# Patient Record
Sex: FEMALE | Race: AMERICAN INDIAN OR ALASKA NATIVE
[De-identification: names, ages, dates, MRNs, and addresses within clinical notes are randomized per-mention and may not be internally consistent; named-entity substitution may affect disease eponyms.]

---

## 2017-09-21 NOTE — MAMMOGRAPHY REPORT
BILATERAL MAMMOGRAM with CAD:



HISTORY:Cancer screening. Comparison study is dated September 19, 2016.



FINDINGS:

The breasts are almost entirely fat (<25% glandular).  No mass,

distortion, suspicious calcification, or skin change is seen.



IMPRESSION:

Negative mammogram.  There is no mammographic evidence of

malignancy.



RECOMMENDATION:

Follow-up per ACS guidelines.



BI-RADS CATEGORY: 1 = Negative



ACR BI-RADS MAMMOGRAPHIC CODES:

0 = Needs additional imaging evaluation; 1 = Negative; 2 = Benign;

3 = Probably benign; 4 = Suspicious; 5 = Malignant; 6 = Known

biopsy-proven malignancy



COMMENT:

      1.   Dense breast tissue, i.e., adenosis, fibrocystic 

            changes, etc., may obscure an underlying neoplasm.

      2.   Approximately 10% of cancers are not detected with

            mammography.

      3.   A negative mammography report should not delay biopsy 

            if a clinically suspicious mass is present.



COMMENT:

Patient follow-up letters are generated in [employer]

## 2018-09-24 ENCOUNTER — HOSPITAL ENCOUNTER (OUTPATIENT)
Dept: HOSPITAL 5 - MAMMO | Age: 68
Discharge: HOME | End: 2018-09-24
Attending: SPECIALIST
Payer: MEDICARE

## 2018-09-24 DIAGNOSIS — Z12.31: Primary | ICD-10-CM

## 2018-09-24 PROCEDURE — 77067 SCR MAMMO BI INCL CAD: CPT

## 2018-09-24 NOTE — MAMMOGRAPHY REPORT
BILATERAL MAMMOGRAM:



FINDINGS:

The breasts are almost entirely fat (<25% glandular).  No mass,

distortion, suspicious calcification, or skin change is seen. No 

interval changes when compared to prior exams dating back to September 2016.



CAD was utilized.



IMPRESSION:

Negative mammogram.  There is no mammographic evidence of

malignancy.



RECOMMENDATION:

Follow-up per ACS guidelines.



BI-RADS CATEGORY: 1 = Negative



ACR BI-RADS MAMMOGRAPHIC CODES:

0 = Needs additional imaging evaluation; 1 = Negative; 2 = Benign;

3 = Probably benign; 4 = Suspicious; 5 = Malignant; 6 = Known

biopsy-proven malignancy



COMMENT:

      1.   Dense breast tissue, i.e., adenosis, fibrocystic 

            changes, etc., may obscure an underlying neoplasm.

      2.   Approximately 10% of cancers are not detected with

            mammography.

      3.   A negative mammography report should not delay biopsy 

            if a clinically suspicious mass is present.



COMMENT:

Patient follow-up letters are generated in [employer].

## 2019-11-04 ENCOUNTER — HOSPITAL ENCOUNTER (OUTPATIENT)
Dept: HOSPITAL 5 - MAMMO | Age: 69
Discharge: HOME | End: 2019-11-04
Attending: SPECIALIST
Payer: OTHER GOVERNMENT

## 2019-11-04 DIAGNOSIS — Z12.31: Primary | ICD-10-CM

## 2019-11-04 PROCEDURE — 77067 SCR MAMMO BI INCL CAD: CPT

## 2019-11-05 NOTE — MAMMOGRAPHY REPORT
DIGITAL SCREENING MAMMOGRAM WITH CAD, 11/4/2019



INDICATION: Routine screening mammography. 



TECHNIQUE:  Digital bilateral  2D mammography was obtained in the craniocaudal and mediolateral obliq
ue projections. This examination was interpreted with the benefit of Computer-Aided Detection analysi
s.



COMPARISON: 9/24/2018



FINDINGS: 



Breast Density: The breasts are almost entirely fatty.



There is no evidence of dominant mass, suspicious calcifications or architectural distortion in eithe
r breast.



IMPRESSION: No mammographic evidence of malignancy.



Follow up recommendation: Routine yearly



BI-RADS Category 1:  Negative.



A "normal" or negative report should not discourage follow up or biopsy of a clinically significant f
inding.



A written summary of these findings will be mailed to the patient. The patient will be entered into a
 mammography reporting system which will generate a reminder letter for the patient's next appointmen
t at the appropriate interval.



The American College of Radiology recommends yearly mammograms starting at age 40 and continuing as l
claudia as a woman is in good health.  Breast MRI is recommended for women with an approximate 20-25% or 
greater lifetime risk of breast cancer, including women with a strong family history of breast or ova
eliud cancer or who have been treated for Hodgkin's disease.



Signer Name: Josh Coronado MD 

Signed: 11/5/2019 2:19 PM

 Workstation Name: [id number]

## 2019-11-13 ENCOUNTER — HOSPITAL ENCOUNTER (OUTPATIENT)
Dept: HOSPITAL 5 - XRAY | Age: 69
Discharge: HOME | End: 2019-11-13
Attending: INTERNAL MEDICINE
Payer: MEDICARE

## 2019-11-13 DIAGNOSIS — M25.569: Primary | ICD-10-CM

## 2019-11-13 NOTE — XRAY REPORT
RIGHT KNEE 4 VIEWS.



INDICATION / CLINICAL INFORMATION:

MAIN: KNEE JOINT PAINFUL ON MOVEMENT PAIN FOR THE PAST 10 YRS, PAIN INCREASING X2 YRS, NO RECENT INJU
RY; PREVIOUS INJURY 40+YRS AGO



COMPARISON:

None available.

 

FINDINGS:



BONES / JOINT(S): No acute fracture or subluxation. DJD greatest the patellofemoral compartment where
 changes are moderate. Underlying osteopenia.

SOFT TISSUES: No significant abnormality.



ADDITIONAL FINDINGS: None.







Signer Name: Taj Schaefer MD 

Signed: 11/13/2019 5:15 PM

 Workstation Name: [email or web address]

## 2021-02-24 ENCOUNTER — HISTORICAL (OUTPATIENT)
Dept: ADMINISTRATIVE | Facility: HOSPITAL | Age: 71
End: 2021-02-24

## 2021-02-24 LAB
ALBUMIN SERPL BCP-MCNC: 3.7 G/DL (ref 3.5–5)
ALBUMIN/GLOB SERPL: 0.9 {RATIO}
ALP SERPL-CCNC: 66 U/L (ref 55–142)
ALT SERPL W P-5'-P-CCNC: 20 U/L (ref 13–56)
ANION GAP SERPL CALCULATED.3IONS-SCNC: 9 MMOL/L (ref 7–16)
AST SERPL W P-5'-P-CCNC: 14 U/L (ref 15–37)
BASOPHILS # BLD AUTO: 0.04 X10E3/UL (ref 0–0.2)
BASOPHILS NFR BLD AUTO: 0.5 % (ref 0–1)
BILIRUB SERPL-MCNC: 0.6 MG/DL (ref 0–1.2)
BUN SERPL-MCNC: 11 MG/DL (ref 7–18)
BUN/CREAT SERPL: 14
CALCIUM SERPL-MCNC: 9 MG/DL (ref 8.5–10.1)
CHLORIDE SERPL-SCNC: 110 MMOL/L (ref 98–107)
CHOLEST SERPL-MCNC: 150 MG/DL
CHOLEST/HDLC SERPL: 2.2 {RATIO}
CO2 SERPL-SCNC: 26 MMOL/L (ref 21–32)
CREAT SERPL-MCNC: 0.76 MG/DL (ref 0.5–1.02)
EOSINOPHIL # BLD AUTO: 0.05 X10E3/UL (ref 0–0.5)
EOSINOPHIL NFR BLD AUTO: 0.6 % (ref 1–4)
ERYTHROCYTE [DISTWIDTH] IN BLOOD BY AUTOMATED COUNT: 13.8 % (ref 11.5–14.5)
GLOBULIN SER-MCNC: 3.9 G/DL (ref 2–4)
GLUCOSE SERPL-MCNC: 84 MG/DL (ref 74–106)
HCT VFR BLD AUTO: 38.2 % (ref 38–47)
HDLC SERPL-MCNC: 69 MG/DL
HGB BLD-MCNC: 12.5 G/DL (ref 12–16)
IMM GRANULOCYTES # BLD AUTO: 0.01 X10E3/UL (ref 0–0.04)
IMM GRANULOCYTES NFR BLD: 0.1 % (ref 0–0.4)
LDLC SERPL CALC-MCNC: 68 MG/DL
LYMPHOCYTES # BLD AUTO: 2.52 X10E3/UL (ref 1–4.8)
LYMPHOCYTES NFR BLD AUTO: 29.7 % (ref 27–41)
MAGNESIUM SERPL-MCNC: 1.9 MG/DL (ref 1.7–2.3)
MCH RBC QN AUTO: 29.8 PG (ref 27–31)
MCHC RBC AUTO-ENTMCNC: 32.7 G/DL (ref 32–36)
MCV RBC AUTO: 91 FL (ref 80–96)
MONOCYTES # BLD AUTO: 0.74 X10E3/UL (ref 0–0.8)
MONOCYTES NFR BLD AUTO: 8.7 % (ref 2–6)
MPC BLD CALC-MCNC: 11.2 FL (ref 9.4–12.4)
NEUTROPHILS # BLD AUTO: 5.12 X10E3/UL (ref 1.8–7.7)
NEUTROPHILS NFR BLD AUTO: 60.4 % (ref 53–65)
NRBC # BLD AUTO: 0 X10E3/UL (ref 0–0)
NRBC, AUTO (.00): 0 /100 (ref 0–0)
PLATELET # BLD AUTO: 266 X10E3/UL (ref 150–400)
POTASSIUM SERPL-SCNC: 3.6 MMOL/L (ref 3.5–5.1)
PROT SERPL-MCNC: 7.6 G/DL (ref 6.4–8.2)
RBC # BLD AUTO: 4.2 X10E6/UL (ref 4.2–5.4)
SODIUM SERPL-SCNC: 141 MMOL/L (ref 136–145)
T4 FREE SERPL-MCNC: 0.96 NG/DL (ref 0.76–1.46)
TRIGL SERPL-MCNC: 67 MG/DL
TSH SERPL DL<=0.005 MIU/L-ACNC: 0.76 UIU/ML (ref 0.36–3.74)
WBC # BLD AUTO: 8.48 X10E3/UL (ref 4.5–11)

## 2021-02-25 LAB
25(OH)D3 SERPL-MCNC: 16.8 NG/ML
EST. AVERAGE GLUCOSE BLD GHB EST-MCNC: 87 MG/DL
HBA1C MFR BLD HPLC: 5.2 %

## 2021-03-29 ENCOUNTER — HOSPITAL ENCOUNTER (OUTPATIENT)
Dept: RADIOLOGY | Facility: HOSPITAL | Age: 71
Discharge: HOME | End: 2021-03-29
Attending: OBSTETRICS & GYNECOLOGY
Payer: MEDICARE

## 2021-03-29 VITALS — BODY MASS INDEX: 30.73 KG/M2 | HEIGHT: 64 IN | WEIGHT: 180 LBS

## 2021-03-29 DIAGNOSIS — Z12.31 SCREENING MAMMOGRAM, ENCOUNTER FOR: ICD-10-CM

## 2021-03-29 PROCEDURE — 77067 SCR MAMMO BI INCL CAD: CPT | Mod: TC

## 2021-05-05 ENCOUNTER — HOSPITAL ENCOUNTER (OUTPATIENT)
Dept: RADIOLOGY | Facility: HOSPITAL | Age: 71
Discharge: HOME | End: 2021-05-05
Attending: RADIOLOGY
Payer: MEDICARE

## 2021-05-05 DIAGNOSIS — R92.8 ABNORMAL FINDING ON BREAST IMAGING: ICD-10-CM

## 2021-05-05 DIAGNOSIS — M54.6 PAIN IN THORACIC SPINE: Primary | ICD-10-CM

## 2021-05-05 PROCEDURE — 77065 DX MAMMO INCL CAD UNI: CPT | Mod: TC,LT

## 2021-05-18 ENCOUNTER — CLINICAL SUPPORT (OUTPATIENT)
Dept: REHABILITATION | Facility: HOSPITAL | Age: 71
End: 2021-05-18
Payer: MEDICARE

## 2021-05-18 ENCOUNTER — DOCUMENTATION ONLY (OUTPATIENT)
Dept: REHABILITATION | Facility: HOSPITAL | Age: 71
End: 2021-05-18

## 2021-05-18 DIAGNOSIS — M25.60 DECREASED RANGE OF MOTION: ICD-10-CM

## 2021-05-18 DIAGNOSIS — R53.1 DECREASED STRENGTH: ICD-10-CM

## 2021-05-18 DIAGNOSIS — M54.6 PAIN IN THORACIC SPINE: Primary | ICD-10-CM

## 2021-05-18 DIAGNOSIS — M25.512 LEFT SHOULDER PAIN, UNSPECIFIED CHRONICITY: ICD-10-CM

## 2021-05-18 DIAGNOSIS — M25.561 RIGHT KNEE PAIN, UNSPECIFIED CHRONICITY: ICD-10-CM

## 2021-05-18 DIAGNOSIS — M25.551 RIGHT HIP PAIN: ICD-10-CM

## 2021-05-18 PROCEDURE — 97110 THERAPEUTIC EXERCISES: CPT | Mod: PN,CQ

## 2022-01-21 DIAGNOSIS — M25.561 RIGHT KNEE PAIN: ICD-10-CM

## 2022-01-21 DIAGNOSIS — M25.519 SHOULDER PAIN: ICD-10-CM

## 2022-01-21 DIAGNOSIS — M54.2 CERVICAL PAIN: Primary | ICD-10-CM

## 2022-01-21 DIAGNOSIS — M54.50 LOW BACK PAIN: ICD-10-CM

## 2022-01-29 ENCOUNTER — HOSPITAL ENCOUNTER (EMERGENCY)
Facility: HOSPITAL | Age: 72
Discharge: HOME | End: 2022-01-29
Payer: MEDICARE

## 2022-01-29 VITALS
RESPIRATION RATE: 20 BRPM | SYSTOLIC BLOOD PRESSURE: 167 MMHG | OXYGEN SATURATION: 97 % | WEIGHT: 178.5 LBS | DIASTOLIC BLOOD PRESSURE: 88 MMHG | TEMPERATURE: 99 F | HEIGHT: 64 IN | BODY MASS INDEX: 30.48 KG/M2 | HEART RATE: 70 BPM

## 2022-01-29 DIAGNOSIS — R09.A9 FOREIGN BODY SENSATION IN EAR CANAL, LEFT: Primary | ICD-10-CM

## 2022-01-29 PROCEDURE — 99282 EMERGENCY DEPT VISIT SF MDM: CPT | Mod: ,,, | Performed by: NURSE PRACTITIONER

## 2022-01-29 PROCEDURE — 99282 EMERGENCY DEPT VISIT SF MDM: CPT

## 2022-01-29 PROCEDURE — 99282 PR EMERGENCY DEPT VISIT,LEVEL II: ICD-10-PCS | Mod: ,,, | Performed by: NURSE PRACTITIONER

## 2022-01-29 NOTE — DISCHARGE INSTRUCTIONS
Avoid placing anything smaller than your finger when covered with vasculopathy in 2 years.  Follow-up with your primary care provider as needed.

## 2022-01-29 NOTE — ED PROVIDER NOTES
Encounter Date: 1/29/2022       History     Chief Complaint   Patient presents with    Foreign Body in Ear     71-year-old female presents emergency department 1 week history of complaint of foreign body in her left ear.  States that she was taking half pin out of her hand feels her hair pin fell into her ear.  She has been trying to retrieve herself without success.  She denies injury trauma.  She denies fever, chills or any sign or symptom flexion.  She denies ear pain.        Review of patient's allergies indicates:  No Known Allergies  History reviewed. No pertinent past medical history.  Past Surgical History:   Procedure Laterality Date    BUNIONECTOMY Bilateral     HYSTERECTOMY      ROTATOR CUFF REPAIR Right      History reviewed. No pertinent family history.  Social History     Tobacco Use    Smoking status: Never Smoker    Smokeless tobacco: Never Used   Substance Use Topics    Alcohol use: Never    Drug use: Never     Review of Systems   Neurological: Negative for dizziness, syncope, speech difficulty, weakness, light-headedness and headaches.       Physical Exam     Initial Vitals [01/29/22 1330]   BP Pulse Resp Temp SpO2   (!) 167/88 70 20 98.5 °F (36.9 °C) 97 %      MAP       --         Physical Exam    Constitutional: She appears well-developed and well-nourished.   HENT:   Head: Normocephalic and atraumatic.   Left Ear: External ear normal.   Nose: Nose normal.   Mouth/Throat: Oropharynx is clear and moist.   Small area of dried blood in the middle ear canal.  Scratch noted.  No foreign body noted.  Tympanic membrane normal.           Medical Screening Exam   See Full Note    ED Course   Procedures  Labs Reviewed - No data to display       Imaging Results    None          Medications - No data to display                    Clinical Impression:   Final diagnoses:  [H61.892] Foreign body sensation in ear canal, left (Primary)          ED Disposition Condition    Discharge Stable        ED  Prescriptions     None        Follow-up Information    None          Ben Hawley, JUAN  01/29/22 1354       Ben Hawley, JUAN  03/02/22 1519

## 2022-02-14 ENCOUNTER — CLINICAL SUPPORT (OUTPATIENT)
Dept: REHABILITATION | Facility: HOSPITAL | Age: 72
End: 2022-02-14
Payer: MEDICARE

## 2022-02-14 DIAGNOSIS — M54.2 CERVICAL PAIN: ICD-10-CM

## 2022-02-14 DIAGNOSIS — G89.29 CHRONIC BILATERAL LOW BACK PAIN WITHOUT SCIATICA: ICD-10-CM

## 2022-02-14 DIAGNOSIS — M25.511 BILATERAL SHOULDER PAIN, UNSPECIFIED CHRONICITY: ICD-10-CM

## 2022-02-14 DIAGNOSIS — M54.50 LOW BACK PAIN: ICD-10-CM

## 2022-02-14 DIAGNOSIS — M25.519 SHOULDER PAIN: ICD-10-CM

## 2022-02-14 DIAGNOSIS — M54.50 CHRONIC BILATERAL LOW BACK PAIN WITHOUT SCIATICA: ICD-10-CM

## 2022-02-14 DIAGNOSIS — M25.512 BILATERAL SHOULDER PAIN, UNSPECIFIED CHRONICITY: ICD-10-CM

## 2022-02-14 PROCEDURE — 97161 PT EVAL LOW COMPLEX 20 MIN: CPT | Mod: PN

## 2022-02-14 PROCEDURE — 97110 THERAPEUTIC EXERCISES: CPT | Mod: PN

## 2022-02-14 NOTE — PLAN OF CARE
[facility]  Physical Therapy Initial Evaluation    Name: Shelby Salcedo  Clinic Number: [id number]    Therapy Diagnosis:   Encounter Diagnoses   Name Primary?    Cervical pain     Shoulder pain     Low back pain      Physician: Stacey Chapa, NP    Physician Orders: PT Eval and Treat    Medical Diagnosis from Referral: neck , shoulder and back pain   Evaluation Date: 2/14/2022  Authorization Period Expiration: medicare   Plan of Care Expiration: 3/13/2022  Visit # / Visits authorized: 1/ 20    Time In: 1230  Time Out: 1330  Total Appointment Time (timed & untimed codes): 60 minutes    Precautions: Standard    Subjective   Date of onset: pt states she began feeling worse after having covid last July and this last October she became weak and just not as active, having increased back , neck and shoulder pain, pt voices she can tell a lot of her pain is from postural weakness .     History of current condition - Shelby reports: hx above.      Medical History:   No past medical history on file.    Surgical History:   Shelby Salcedo  has a past surgical history that includes Hysterectomy; Bunionectomy (Bilateral); and Rotator cuff repair (Right).    Medications:   Shelby currently has no medications in their medication list.    Allergies:   Review of patient's allergies indicates:  No Known Allergies     Imaging, none:      Prior Therapy: over 6 months   Social History:   lives alone  Occupation: retired  Prior Level of Function: independent   Current Level of Function: increased fatigue shoulder and back pain      Pain:  Current 10/10, worst 10/10, best 6/10   Location: bilateral shoulder, back and neck    Description: Aching, Dull, Burning, Throbbing and Variable  Aggravating Factors: Sitting, Standing and Getting out of bed/chair  Easing Factors: nothing    Pts goals: be able to increase activity to be able to do her yard work     Objective     Posture:   Forward head: increased  Thoracic curve:  increased  Cervical curve: increased  Laterally flexed: left  Rotated: left  Rounded shoulders: yes  Scoliosis: yes  Shoulder height: right increased  Clavical height: right increased  Comments:    Cervical AROM:    SBL: 10   SBR: 15  RR: 30  RL:  20    MMT Right  Left    C1-C2 Chin up MMT strength: 3/5 MMT strength: 3/5   C1-C2 Chin in MMT shoulder: 3/5 MMT strength: 3/5   C3 Lateral flexion MMT strength: 3/5 MMT strength: 3/5   C4 Shoulder Shrug MMT strength: 4+/5 MMT strength: 4+/5   C5 Biceps MMT strength: 4/5 MMT strength: 4/5   C6 Wrist extension MMT strength: 4/5 MMT strength: 4/5   C7 Triceps MMT strength: 4/5 MMT strength: 4/5   Hip flexion      MMT strength: 3+/5 MMT strength: 3+/5     Knee flexion   Knee extension       Shoulder AROM Right  Left    Flexion (180) 120 120   Internal rotation (45)     External rotation (45) 45 45   Abduction (180) 120 120   Hip flexion  95 95   Hamstring  -15 -15   Other       Segment/Mobility:     Special test:    Compression test Negative   Thoracic outlet  Negative   Distraction Negative                 Palpation Body side Positive/negative Increased tone   Sternocleidomastoid bilateral Negative yes   Scalenes bilateral Negative yes   1st rib bilateral Positive yes   Upper traps bilateral Positive yes   Suboccipitals bilateral Positive yes   Transverse process bilateral Positive yes   Spinous process bilateral Positive yes   Pec Minor bilateral Positive yes   Masseter bilateral Negative no   Mastoid process bilateral Negative no         Comments        Other tests/information:    Limitation/Restriction for FOTO shoulder Survey    Therapist reviewed FOTO scores for Shelby Salcedo on 2/14/2022.   FOTO documents entered into [employer] see Media section.    Limitation Score: 34%         TREATMENT     Shelby received the treatments listed below:  THERAPEUTIC EXERCISES to develop strength, endurance, ROM, flexibility and posture for 20 minutes including home ex program     Home  Exercises and Patient Education Provided    Education provided:   - range of motion and strengthening     Written Home Exercises Provided: yes.  Exercises were reviewed and Shelby was able to demonstrate them prior to the end of the session.  Shelby demonstrated good  understanding of the education provided.     See EMR under Media for exercises provided 2/14/2022.    Assessment   Shelby is a 71 y.o. female referred to outpatient Physical Therapy with a medical diagnosis of low back pain , neck pain shoulder pain . Pt presents with decreased range of motion and strength.      Pt prognosis is Excellent.   Pt will benefit from skilled outpatient Physical Therapy to address the deficits stated above and in the chart below, provide pt/family education, and to maximize pt's level of independence.     Plan of care discussed with patient: Yes  Pt's spiritual, cultural and educational needs considered and patient is agreeable to the plan of care and goals as stated below:     Anticipated Barriers for therapy: decreased range of motion and strength .     Goals:  Short Term Goals: 4 weeks   Pt will be independent with home ex program .   Pt will increase hip flexion to 115 degrees   Pt will be able to tolerate 20 min of cardio   Pt will be able to increase cervical rotation to 45  degrees rotation   Pt will be able to increase lateral tilt to 30 degrees     Long Term Goals: 6 weeks   Pt will be able to shop 30 min   Pt will be able to increase rotation cervical rotation to 50 degrees, lateral tilt to 40 degrees  Pt will be able to shop greater than one hour   Be able to return to yard work.     Plan   Plan of care Certification: 2/14/2022 to 3/13/2022.    Outpatient Physical Therapy 2 times weekly for 4 weeks to include the following interventions: Therapeutic Exercise, modalities as needed.     Plan of care has been reestablished with Iwona ELIAS and Tiana ELIAS. .     Dakota Canales, PT           I CERTIFY  THE NEED FOR THESE SERVICES FURNISHED UNDER THIS PLAN OF TREATMENT AND WHILE UNDER MY CARE.    Physician's comments:      Physician's Signature: ___________________________________________________

## 2022-02-23 ENCOUNTER — CLINICAL SUPPORT (OUTPATIENT)
Dept: REHABILITATION | Facility: HOSPITAL | Age: 72
End: 2022-02-23
Payer: MEDICARE

## 2022-02-23 DIAGNOSIS — R53.1 DECREASED STRENGTH: ICD-10-CM

## 2022-02-23 DIAGNOSIS — M54.2 CERVICAL PAIN: Primary | ICD-10-CM

## 2022-02-23 DIAGNOSIS — M25.512 BILATERAL SHOULDER PAIN, UNSPECIFIED CHRONICITY: ICD-10-CM

## 2022-02-23 DIAGNOSIS — M25.511 BILATERAL SHOULDER PAIN, UNSPECIFIED CHRONICITY: ICD-10-CM

## 2022-02-23 PROCEDURE — 97110 THERAPEUTIC EXERCISES: CPT | Mod: PN,CQ

## 2022-02-23 PROCEDURE — 97014 ELECTRIC STIMULATION THERAPY: CPT | Mod: PN,CQ

## 2022-02-23 NOTE — PROGRESS NOTES
"  Physical Therapy Treatment Note     Name: Shelby Salcedo  Clinic Number: [id number]    Therapy Diagnosis:   Encounter Diagnoses   Name Primary?    Cervical pain Yes    Bilateral shoulder pain, unspecified chronicity     Decreased strength      Physician: Stacey Chapa NP    Visit Date: 2/23/2022    Physician Orders: PT Eval and Treat    Medical Diagnosis from Referral: neck , shoulder and back pain   Evaluation Date: 2/14/2022  Authorization Period Expiration: medicare   Plan of Care Expiration: 3/13/2022  Visit # / Visits authorized: 2/ 20  PTA Visit #: 1    Time In: 1230  Time Out: 1320  Total Billable Time: 50 minutes    Precautions: Standard    Received Plan of Care per Dakota Canales PT     Subjective     Pt reports: pain/stiffness as noted in neck, shoulders and upper back. Denies taking pain meds..."I can't take anything"; reports living alone and has a lot to do and has been busy today.  She was compliant with home exercise program.  Response to previous treatment: sore, stiff      Pain: 8/10  Location: bilateral shoulders, neck, upper back    Objective     Shelby received therapeutic exercises to develop strength, ROM, flexibility and posture for 35 minutes including:    UBE x 5 minutes   pulleys x 4 minutes   Scapular retraction with BUE rows, extension x 15 repetitions each  Doorway pec stretching, 10 x 10 second hold   Wall slides with towel for flexion x 15 repetitions each  Seated cervical range of motion, 6x10 second hold each way: rotation and tilt, right and left   Supine bilateral lower extremity stretching, 3x20 second hold each:   Single knee to chest, hamstring, hip external rotation, piriformis    Range of motion measures:   Neck rotation   Right 65 degrees   Left 50 degrees    Neck tilt  Right 25 degrees  Left 20 degrees   Hip flexion   Right  95 degrees  Left 105 degrees   Hamstring   Right -13 degrees Left -10 degrees       Shelby received the following direct contact modalities " after being cleared for contraindications: Ultrasound:  Shelby received ultrasound to manage pain and inflammation at 100 % duty cycle applied to the NA at an intensity of NA W/cm2  for a duration of NA minutes. Patient tolerated treatment well without adverse effects. Therapist was in attendance throughout intervention.    Shelby received the following supervised modalities after being cleared for contradictions: IFC Electrical Stimulation:  Shelby received IFC Electrical Stimulation for pain control applied to the neck and thoracic paraspinals. Pt received stimulation at 100 % scan at a frequency of 14 for 15 minutes. Shelby tolderated treatment well without any adverse effects.    Shelby received hot pack for 15 minutes to neck and upper back with IFC.      Home Exercises Provided and Patient Education Provided     Education provided: continue with current home exercise program and work on posture correction throughout the day; along with bilateral lower extremity stretches as done during today's session     Written Home Exercises Provided: Patient instructed to cont prior HEP.  Exercises were reviewed and Shelby was able to demonstrate them prior to the end of the session.  Shelby demonstrated good  understanding of the education provided.     See EMR under Patient Instructions for exercises provided prior visit.    Assessment     Improving neck range of motion; pt pain down to 7/10 post treatment from 8/10 on arrival  Slow, guarded during treatment session and transitioning between activities  Shelby Is progressing well towards her goals.   Pt prognosis is Good.     Pt will continue to benefit from skilled outpatient physical therapy to address the deficits listed in the problem list box on initial evaluation, provide pt/family education and to maximize pt's level of independence in the home and community environment.      Anticipated barriers to physical therapy: home exercise program  compliance    Goals:  Short Term Goals: 4 weeks   Pt will be independent with home ex program .   Pt will increase hip flexion to 115 degrees   Pt will be able to tolerate 20 min of cardio   Pt will be able to increase cervical rotation to 45  degrees rotation   Pt will be able to increase lateral tilt to 30 degrees      Long Term Goals: 6 weeks   Pt will be able to shop 30 min   Pt will be able to increase rotation cervical rotation to 50 degrees, lateral tilt to 40 degrees  Pt will be able to shop greater than one hour   Be able to return to yard work.     Plan     Continue per Plan of Care and progress as pt able   Tiana Baeza, PTA  2/23/2022

## 2022-03-09 ENCOUNTER — CLINICAL SUPPORT (OUTPATIENT)
Dept: REHABILITATION | Facility: HOSPITAL | Age: 72
End: 2022-03-09
Payer: MEDICARE

## 2022-03-09 DIAGNOSIS — G89.29 CHRONIC BILATERAL LOW BACK PAIN WITHOUT SCIATICA: ICD-10-CM

## 2022-03-09 DIAGNOSIS — M25.511 BILATERAL SHOULDER PAIN, UNSPECIFIED CHRONICITY: Primary | ICD-10-CM

## 2022-03-09 DIAGNOSIS — M54.50 CHRONIC BILATERAL LOW BACK PAIN WITHOUT SCIATICA: ICD-10-CM

## 2022-03-09 DIAGNOSIS — M25.512 BILATERAL SHOULDER PAIN, UNSPECIFIED CHRONICITY: Primary | ICD-10-CM

## 2022-03-09 DIAGNOSIS — M54.2 CERVICAL PAIN: ICD-10-CM

## 2022-03-09 PROCEDURE — 97110 THERAPEUTIC EXERCISES: CPT | Mod: PN,CQ

## 2022-03-09 PROCEDURE — 97014 ELECTRIC STIMULATION THERAPY: CPT | Mod: PN,CQ

## 2022-03-09 NOTE — PROGRESS NOTES
Physical Therapy Treatment Note     Name: Shelby Salcedo  Clinic Number: [id number]    Therapy Diagnosis:   Encounter Diagnoses   Name Primary?    Bilateral shoulder pain, unspecified chronicity Yes    Cervical pain      Physician: Stacey Chapa NP    Visit Date: 3/9/2022    Physician Orders: PT Eval and Treat    Medical Diagnosis from Referral: neck , shoulder and back pain   Evaluation Date: 2/14/2022  Authorization Period Expiration: medicare   Plan of Care Expiration: 3/13/2022  Visit # / Visits authorized: 3/ 20  PTA Visit #: 2    Time In: 1230  Time Out: 120  Total Billable Time: 50 minutes    Precautions: Standard     Subjective     Pt reports: I've been doing some of exercises at home. Shoulders and neck hurt the most today and I'm having some vertigo.  She was compliant with home exercise program.  Response to previous treatment: sore, stiff      Pain: 7/10  Location: bilateral shoulders, neck, upper back    Objective     Shelby received therapeutic exercises to develop strength, ROM, flexibility and posture for 35 minutes including:    UBE x 5 minutes   pulleys x 4 minutes   Scapular retraction with BUE rows, extension x 15 repetitions each  Doorway pec stretching, 10 x 10 second hold   Seated cervical range of motion, 6x10 second hold each way: rotation and tilt, right and left   Supine bilateral lower extremity stretching, 3x20 second hold each:   Single knee to chest, hamstring, hip external rotation, piriformis  Bilateral hamstrings stretch on step x 5  Slant board x 2'  Swissball knee flexion x 10  Swissball trunk rotation x 10    Range of motion measures:   Neck rotation   Right 60 degrees   Left 50 degrees    Neck tilt  Right 23 degrees  Left 20 degrees   Hip flexion   Right  97 degrees  Left 105 degrees   Hamstring   Right -13 degrees Left -10 degrees       Shelby received the following direct contact modalities after being cleared for contraindications: Ultrasound:  Shelby received  ultrasound to manage pain and inflammation at 100 % duty cycle applied to the NA at an intensity of NA W/cm2  for a duration of NA minutes. Patient tolerated treatment well without adverse effects. Therapist was in attendance throughout intervention.    Shelby received the following supervised modalities after being cleared for contradictions: IFC Electrical Stimulation:  Shelby received IFC Electrical Stimulation for pain control applied to the neck and thoracic paraspinals. Pt received stimulation at 100 % scan at a frequency of 12 for 15 minutes. Shelby tolderated treatment well without any adverse effects.    Shelby received hot pack for 15 minutes to neck and upper back with IFC.      Home Exercises Provided and Patient Education Provided     Education provided: continue with current home exercise program and work on posture correction throughout the day; along with bilateral lower extremity stretches as done during today's session     Written Home Exercises Provided: Patient instructed to cont prior HEP.  Exercises were reviewed and Shelby was able to demonstrate them prior to the end of the session.  Shelby demonstrated good  understanding of the education provided.     See EMR under Patient Instructions for exercises provided prior visit.    Assessment   Patient voicing decreased pain 5/10 post treatment.  Eric Is progressing well towards her goals.   Pt prognosis is Good.     Pt will continue to benefit from skilled outpatient physical therapy to address the deficits listed in the problem list box on initial evaluation, provide pt/family education and to maximize pt's level of independence in the home and community environment.      Anticipated barriers to physical therapy: home exercise program compliance    Goals:  Short Term Goals: 4 weeks   Pt will be independent with home ex program .   Pt will increase hip flexion to 115 degrees   Pt will be able to tolerate 20 min of cardio   Pt will be  able to increase cervical rotation to 45  degrees rotation   Pt will be able to increase lateral tilt to 30 degrees      Long Term Goals: 6 weeks   Pt will be able to shop 30 min   Pt will be able to increase rotation cervical rotation to 50 degrees, lateral tilt to 40 degrees  Pt will be able to shop greater than one hour   Be able to return to yard work.     Plan     Continue per Plan of Care and progress as pt able   Lianne Howard, PTA  3/9/2022

## 2022-03-16 ENCOUNTER — CLINICAL SUPPORT (OUTPATIENT)
Dept: REHABILITATION | Facility: HOSPITAL | Age: 72
End: 2022-03-16
Payer: MEDICARE

## 2022-03-16 DIAGNOSIS — M25.511 BILATERAL SHOULDER PAIN, UNSPECIFIED CHRONICITY: Primary | ICD-10-CM

## 2022-03-16 DIAGNOSIS — M54.40 BILATERAL LOW BACK PAIN WITH SCIATICA, SCIATICA LATERALITY UNSPECIFIED, UNSPECIFIED CHRONICITY: ICD-10-CM

## 2022-03-16 DIAGNOSIS — M54.2 CERVICAL PAIN: ICD-10-CM

## 2022-03-16 DIAGNOSIS — M25.512 BILATERAL SHOULDER PAIN, UNSPECIFIED CHRONICITY: Primary | ICD-10-CM

## 2022-03-16 PROCEDURE — 97110 THERAPEUTIC EXERCISES: CPT | Mod: PN

## 2022-03-16 PROCEDURE — 97014 ELECTRIC STIMULATION THERAPY: CPT | Mod: PN

## 2022-03-16 NOTE — PLAN OF CARE
Physical Therapy Treatment Note      Name: Shelby Salcedo  Clinic Number: [id number]     Therapy Diagnosis:        Encounter Diagnoses   Name Primary?    Bilateral shoulder pain, unspecified chronicity Yes    Cervical pain        Physician: Stacey Chapa NP     Visit Date: 3/16/2022     Physician Orders: PT Eval and Treat    Medical Diagnosis from Referral: neck , shoulder and back pain   Evaluation Date: 2/14/2022  Authorization Period Expiration: medicare   Plan of Care Expiration: 3/13/2022  Visit # / Visits authorized: 4/ 20  PTA Visit #: 2     Time In: 1230  Time Out: 120  Total Billable Time: 50 minutes     Precautions: Standard     Subjective      Pt reports: I've been doing some of exercises at home. Pt voices vertigo really bad today.   She was compliant with home exercise program.  Response to previous treatment: sore, stiff        Pain: 7/10  Location: bilateral shoulders, neck, upper back     Objective      Shelby received therapeutic exercises to develop strength, ROM, flexibility and posture for 35 minutes including:     UBE x 5 minutes   pulleys x 4 minutes   Scapular retraction with BUE rows, extension x 15 repetitions each  Doorway pec stretching, 10 x 10 second hold   Seated cervical range of motion, 6x10 second hold each way: rotation and tilt, right and left   Supine bilateral lower extremity stretching, 3x20 second hold each:              Single knee to chest, hamstring, hip external rotation, piriformis  Bilateral hamstrings stretch on step x 5  Slant board x 2'  Swissball knee flexion x 10  Swissball trunk rotation x 10  Pt received prom to bilateral shoulders x 8 min      Range of motion measures:   Neck rotation               Right 60 degrees        Left 50 degrees    Neck tilt                       Right 23 degrees        Left 20 degrees   Hip flexion                   Right  97 degrees       Left 105 degrees   Hamstring                    Right -13 degrees       Left -10  degrees     Left shoulder               Flexion     160                            Abduction      165                Er 90/95  Right shoulder            Flexion      160                            Abduction       165               Er  90/95            Shelby received the following supervised modalities after being cleared for contradictions: IFC Electrical Stimulation:  Shelby received IFC Electrical Stimulation for pain control applied to the neck and thoracic paraspinals. Pt received stimulation at 100 % scan at a frequency of 12 for 15 minutes. Shelby tolderated treatment well without any adverse effects.    Shelby received hot pack for 15 minutes to neck and upper back with IFC.        Home Exercises Provided and Patient Education Provided      Education provided: continue with current home exercise program and work on posture correction throughout the day; along with bilateral lower extremity stretches as done during today's session      Written Home Exercises Provided: Patient instructed to cont prior HEP.  Exercises were reviewed and Shelby was able to demonstrate them prior to the end of the session.  Shelby demonstrated good  understanding of the education provided.      See EMR under Patient Instructions for exercises provided prior visit.     Assessment   Patient voicing decreased pain 5/10 post treatment.  Eric Is progressing well towards her goals.   Pt prognosis is Good.      Pt will continue to benefit from skilled outpatient physical therapy to address the deficits listed in the problem list box on initial evaluation, provide pt/family education and to maximize pt's level of independence in the home and community environment.      Anticipated barriers to physical therapy: home exercise program compliance     Goals:  Short Term Goals: 4 weeks   Pt will be independent with home ex program .   Pt will increase hip flexion to 115 degrees   Pt will be able to tolerate 20 min of cardio   Pt will  be able to increase cervical rotation to 45  degrees rotation   Pt will be able to increase lateral tilt to 30 degrees      Long Term Goals: 6 weeks   Pt will be able to shop 30 min   Pt will be able to increase rotation cervical rotation to 50 degrees, lateral tilt to 40 degrees  Pt will be able to shop greater than one hour   Be able to return to yard work.      Plan       Reasons for Recertification of Therapy: cont PT     Plan     Updated Certification Period: 3/16/2022 to 4/15/2022  Recommended Treatment Plan: 2 times per week for 4 weeks: Therapeutic Exercise and modalities as needed   Other Recommendations: cont PT    Dakota Canales, PT  3/16/2022      I CERTIFY THE NEED FOR THESE SERVICES FURNISHED UNDER THIS PLAN OF TREATMENT AND WHILE UNDER MY CARE.    Physician's comments:      Physician's Signature: ___________________________________________________

## 2022-03-16 NOTE — PROGRESS NOTES
Physical Therapy Treatment Note     Name: Shelby Salcedo  Clinic Number: [id number]    Therapy Diagnosis:   Encounter Diagnoses   Name Primary?    Bilateral shoulder pain, unspecified chronicity Yes    Cervical pain      Physician: Stacey Chapa NP    Visit Date: 3/16/2022    Physician Orders: PT Eval and Treat    Medical Diagnosis from Referral: neck , shoulder and back pain   Evaluation Date: 2/14/2022  Authorization Period Expiration: medicare   Plan of Care Expiration: 3/13/2022  Visit # / Visits authorized: 4/ 20  PTA Visit #: 2    Time In: 1230  Time Out: 120  Total Billable Time: 50 minutes    Precautions: Standard     Subjective     Pt reports: I've been doing some of exercises at home. Pt voices vertigo really bad today.   She was compliant with home exercise program.  Response to previous treatment: sore, stiff      Pain: 7/10  Location: bilateral shoulders, neck, upper back    Objective     Shelby received therapeutic exercises to develop strength, ROM, flexibility and posture for 35 minutes including:    UBE x 5 minutes   pulleys x 4 minutes   Scapular retraction with BUE rows, extension x 15 repetitions each  Doorway pec stretching, 10 x 10 second hold   Seated cervical range of motion, 6x10 second hold each way: rotation and tilt, right and left   Supine bilateral lower extremity stretching, 3x20 second hold each:   Single knee to chest, hamstring, hip external rotation, piriformis  Bilateral hamstrings stretch on step x 5  Slant board x 2'  Swissball knee flexion x 10  Swissball trunk rotation x 10  Pt received prom to bilateral shoulders x 8 min     Range of motion measures:   Neck rotation   Right 60 degrees   Left 50 degrees    Neck tilt  Right 23 degrees  Left 20 degrees   Hip flexion   Right  97 degrees  Left 105 degrees   Hamstring   Right -13 degrees Left -10 degrees    Left shoulder               Flexion     160                            Abduction      165                Er  90/95  Right shoulder            Flexion      160                            Abduction       165               Er  90/95         Shelby received the following supervised modalities after being cleared for contradictions: IFC Electrical Stimulation:  Shelby received IFC Electrical Stimulation for pain control applied to the neck and thoracic paraspinals. Pt received stimulation at 100 % scan at a frequency of 12 for 15 minutes. Shelby tolderated treatment well without any adverse effects.    Shelby received hot pack for 15 minutes to neck and upper back with IFC.      Home Exercises Provided and Patient Education Provided     Education provided: continue with current home exercise program and work on posture correction throughout the day; along with bilateral lower extremity stretches as done during today's session     Written Home Exercises Provided: Patient instructed to cont prior HEP.  Exercises were reviewed and Shelby was able to demonstrate them prior to the end of the session.  Shelby demonstrated good  understanding of the education provided.     See EMR under Patient Instructions for exercises provided prior visit.    Assessment   Patient voicing decreased pain 5/10 post treatment.  Eric Is progressing well towards her goals.   Pt prognosis is Good.     Pt will continue to benefit from skilled outpatient physical therapy to address the deficits listed in the problem list box on initial evaluation, provide pt/family education and to maximize pt's level of independence in the home and community environment.      Anticipated barriers to physical therapy: home exercise program compliance    Goals:  Short Term Goals: 4 weeks   Pt will be independent with home ex program .   Pt will increase hip flexion to 115 degrees   Pt will be able to tolerate 20 min of cardio   Pt will be able to increase cervical rotation to 45  degrees rotation   Pt will be able to increase lateral tilt to 30 degrees      Long  Term Goals: 6 weeks   Pt will be able to shop 30 min   Pt will be able to increase rotation cervical rotation to 50 degrees, lateral tilt to 40 degrees  Pt will be able to shop greater than one hour   Be able to return to yard work.     Plan     Continue per Plan of Care and progress as pt michael Canales, PT  3/16/2022

## 2022-04-04 ENCOUNTER — HOSPITAL ENCOUNTER (OUTPATIENT)
Dept: RADIOLOGY | Facility: HOSPITAL | Age: 72
Discharge: HOME | End: 2022-04-04
Payer: MEDICARE

## 2022-04-04 VITALS — HEIGHT: 64 IN | WEIGHT: 178 LBS | BODY MASS INDEX: 30.39 KG/M2

## 2022-04-04 DIAGNOSIS — Z12.31 OTHER SCREENING MAMMOGRAM: ICD-10-CM

## 2022-04-04 PROCEDURE — 77067 SCR MAMMO BI INCL CAD: CPT | Mod: TC

## 2022-04-13 NOTE — PLAN OF CARE
Pt did not get presription renewed        Outpatient Therapy Discharge Summary     Name: Shelby Salcedo  [facility] Number: [id number]    Therapy Diagnosis:   Encounter Diagnoses   Name Primary?    Bilateral shoulder pain, unspecified chronicity Yes    Cervical pain     Bilateral low back pain with sciatica, sciatica laterality unspecified, unspecified chronicity      Physician: Stacey Chapa, MARLEE         Assessment    Goals:  Pt was progressing with strength     Discharge reason: Patient has completed the physician's prescription    Plan   This patient is discharged from Physical Therapy.  Dakota Canales, PT

## 2023-03-15 DIAGNOSIS — M54.50 LOW BACK PAIN: Primary | ICD-10-CM

## 2023-03-23 ENCOUNTER — CLINICAL SUPPORT (OUTPATIENT)
Dept: REHABILITATION | Facility: HOSPITAL | Age: 73
End: 2023-03-23
Payer: OTHER GOVERNMENT

## 2023-03-23 DIAGNOSIS — M54.40 ACUTE RIGHT-SIDED LOW BACK PAIN WITH SCIATICA, SCIATICA LATERALITY UNSPECIFIED: Primary | ICD-10-CM

## 2023-03-23 DIAGNOSIS — M54.50 LOW BACK PAIN: ICD-10-CM

## 2023-03-23 PROCEDURE — 97161 PT EVAL LOW COMPLEX 20 MIN: CPT | Mod: PN

## 2023-03-23 PROCEDURE — 97110 THERAPEUTIC EXERCISES: CPT | Mod: PN

## 2023-03-23 NOTE — PLAN OF CARE
[facility]   Physical Therapy Initial Evaluation    Name: Shelby Salcedo  Clinic Number: [id number]    Therapy Diagnosis:   Encounter Diagnosis   Name Primary?    Low back pain      Physician: Rosy Thakur*    Physician Orders: PT Eval and Treat    Medical Diagnosis from Referral: lumbar pain   Evaluation Date: 3/23/2023  Authorization Period Expiration: 04/22/2023  Plan of Care Expiration: 6 visits [place] with [facility] admin.   Visit # / Visits authorized: 1/ 6    Time In: 1310  Time Out: 1400  Total Appointment Time (timed & untimed codes): 50 minutes    Precautions: Standard    Subjective   Date of onset: pt states she started get stiff in her low back and neck and having shoulder pain , started about thanksgiving and progressively has gotten worse. Pt states she gets worn out easily and fatigues easily. Pt voices low back pain and shoulder pain . Pt voices postural muscles are aching.   History of current condition - Shelby reports: hx below      Medical History:   No past medical history on file.    Surgical History:   Shelby Salcedo  has a past surgical history that includes Hysterectomy; Bunionectomy (Bilateral); and Rotator cuff repair (Right).    Medications:   Shelby currently has no medications in their medication list.    Allergies:   Review of patient's allergies indicates:  No Known Allergies     Imaging, bone scan films:      Prior Therapy: none   Social History:    lives with their spouse  Occupation: retired   Prior Level of Function: independent with chronic pain in low back   Current Level of Function: neck and postural pain and low back pain     Pain:  Current 8/10, worst 8/10, best 7/10   Location: bilateral low back and shoulder postural region   Description: Aching, Dull, Throbbing, Grabbing, Tight, and Deep  Aggravating Factors: Sitting, Standing, and Walking  Easing Factors: nothing    Pts goals: pt voices she would like to be able to do her yardwork and not get tired.       Objective     Posture:  Standing lordosis: Decreased  Sitting lordosis: Decreased  Iliac crest height: right increased  PSIS height: right increased  Pelvic rotation/torsion: no  Scoliosis: no  Increase thoracic kyphosis   F     MANUAL MUSCLE TEST  Right left   Hip flexion       L1-2 MMT number: 3+/5 MMT strength: 3+/5   Hip abduction  L4,5,  S1 MMT strength: 3+/5 MMT strength: 3+/5   Knee extension  L3 MMT strength: 3+/5 MMT strength: 3+/5   Knee flexion       L3,4 MMT strength: 3+/5 MMT strength: 3+/5   Ankle dorsiflexion   L4 MMT strength: 3+/5 MMT strength: 3+/5   Ext hallucis longus L5 MMT strength: 3+/5 MMT strength: 3+/5   Ankle plantar flexion S1 MMT strength: 3+/5 MMT strength: 3+/5      ROM/flexibility right left   Hip flexion (120) 95 95   Internal rotation (45) 35 35   External rotation (45) 35 35   Hamstring 90/90 (-10) -15 -15         Cervical latera tilt  15 15   Cervical rotation  45 28     Special test Right  Left    SLR test < 60 degrees Negative Negative   SLR test > 60 degrees Negative Negative   Sitting slump test Negative Negative   Piriformis test Negative Negative   RUPESH test Negative Negative   SI forward bend Positive Positive   SI distraction Positive Positive   SI compression Positive Positive            Palpation: patient has tight paraspinals and glutes, tight pectoralis and weak scapular retraction ( postural fatigue)    Dermatome:      Limitation/Restriction for FOTO lumbar Survey    Therapist reviewed FOTO scores for Shelby Salcedo on 3/23/2023.   FOTO documents entered into [employer] - see Media section.    Limitation Score: 35%         TREATMENT       Shelby received the treatments listed below:  THERAPEUTIC EXERCISES to develop strength, endurance, ROM, flexibility, and posture for 45 minutes including home ex program .     Home Exercises and Patient Education Provided    Education provided:   - home ex program     Written Home Exercises Provided: yes.  Exercises were reviewed  and Shelby was able to demonstrate them prior to the end of the session.  Shelby demonstrated good  understanding of the education provided.     See EMR under Patient Instructions for exercises provided 3/23/2023.    Assessment   Shelby is a 72 y.o. female referred to outpatient Physical Therapy with a medical diagnosis of low back pain and cervical pain . Pt presents with postural weakness and fatigue.      Pt prognosis is Excellent.   Pt will benefit from skilled outpatient Physical Therapy to address the deficits stated above and in the chart below, provide pt/family education, and to maximize pt's level of independence.     Plan of care discussed with patient: Yes  Pt's spiritual, cultural and educational needs considered and patient is agreeable to the plan of care and goals as stated below:     Anticipated Barriers for therapy: medical diagnosis of low back pain and cervical pain . Pt presents with postural weakness and fatigue.          Goals:  Short Term Goals: 4 weeks   Pt will be independent with home ex program.   Pt will increase bilateral hip flexion to 110 degrees   Increase cervical rotation to 60 degrees bilaterally   Pt will decrease back pain from 8/10  to 6/10 to improve quality of life   Increase lower extremity from 3+/5 to 5/5 to increase functional mobility and endurance.        Long Term Goals: 6 weeks   Be able to grocery shop x 30 min pain free  Be able to return to yard work . Pain free     Plan   Plan of care Certification: 3/23/2023 to 4/22/2023.    Outpatient Physical Therapy 2 times weekly for 4 weeks to include the following interventions: Neuromuscular Re-ed, Patient Education, Therapeutic Activities, Therapeutic Exercise, and Ultrasound.     Plan of care has been reestablished with Iwona ELIAS and Malini ELIAS.       Dakota Canales, PT          I CERTIFY THE NEED FOR THESE SERVICES FURNISHED UNDER THIS PLAN OF TREATMENT AND WHILE UNDER MY CARE.    Physician's  comments:      Physician's Signature: ___________________________________________________

## 2023-03-30 ENCOUNTER — CLINICAL SUPPORT (OUTPATIENT)
Dept: REHABILITATION | Facility: HOSPITAL | Age: 73
End: 2023-03-30
Payer: OTHER GOVERNMENT

## 2023-03-30 DIAGNOSIS — M54.2 CERVICAL PAIN: Primary | ICD-10-CM

## 2023-03-30 DIAGNOSIS — M25.69 DECREASED RANGE OF MOTION OF TRUNK AND BACK: ICD-10-CM

## 2023-03-30 DIAGNOSIS — G89.29 CHRONIC BILATERAL LOW BACK PAIN WITH SCIATICA, SCIATICA LATERALITY UNSPECIFIED: ICD-10-CM

## 2023-03-30 DIAGNOSIS — M54.40 CHRONIC BILATERAL LOW BACK PAIN WITH SCIATICA, SCIATICA LATERALITY UNSPECIFIED: ICD-10-CM

## 2023-03-30 DIAGNOSIS — R29.898 DECREASED RANGE OF MOTION OF NECK: ICD-10-CM

## 2023-03-30 PROBLEM — M54.42 CHRONIC BILATERAL LOW BACK PAIN WITH SCIATICA: Status: ACTIVE | Noted: 2022-02-14

## 2023-03-30 PROBLEM — M54.41 CHRONIC BILATERAL LOW BACK PAIN WITH SCIATICA: Status: ACTIVE | Noted: 2022-02-14

## 2023-03-30 PROCEDURE — 97112 NEUROMUSCULAR REEDUCATION: CPT | Mod: PN,CQ

## 2023-03-30 PROCEDURE — 97110 THERAPEUTIC EXERCISES: CPT | Mod: PN,CQ

## 2023-03-30 PROCEDURE — 97014 ELECTRIC STIMULATION THERAPY: CPT | Mod: PN,CQ

## 2023-03-30 NOTE — PROGRESS NOTES
Physical Therapy Treatment Note     Name: Shelby Salcedo  Clinic Number: [id number]    Therapy Diagnosis: No diagnosis found.  Physician: Order, Paper    Visit Date: 3/30/2023   Physician Orders: PT Eval and Treat    Medical Diagnosis from Referral: lumbar pain   Evaluation Date: 3/23/2023  Authorization Period Expiration: 04/22/2023  Plan of Care Expiration: 6 visits approved with va admin.   Visit # / Visits authorized: 2/ 6  PTA Visit #: 1    Time In: 112  Time Out: 200  Total Billable Time: 48 minutes    Precautions: Standard  Plan of care reviewed with Dakota Canales PT.      Subjective     Pt reports: low been doing more at home.  She was compliant with home exercise program.  Response to previous treatment: sore  Functional change: improved motion with adl's    Pain: 7/10  Location: bilateral back      Objective     Shelby received therapeutic exercises to develop strength, endurance, ROM, flexibility, posture, and core stabilization for 30 minutes including:  Bike x 4'  Swissball knee flexion x 10  Swissball trunk rotation x 10  Hip adduction with bolster x 10  Bilateral single knee to chest x 5  Bilateral piriformis x 5  Bilateral cervical rotation x 10      Range of motion   Hip flexion   right  96           left  96   Cervical rotation right  47    left  30       Shelby participated in neuromuscular re-education activities to improve: Kinesthetic and Posture for 8 minutes. The following activities were included:  Slant board x 2'  Bilateral hamstrings stretch on step x 5  Scapular retraction x 15    Shelby received the following direct contact modalities after being cleared for contraindications:     Shelby received the following supervised modalities after being cleared for contradictions: IFC Electrical Stimulation:  Shelby received IFC Electrical Stimulation for pain control applied to the neck/back. Pt received stimulation at 100 % scan at a frequency of 16 for 18 minutes. Shelby tolderated  treatment well without any adverse effects.      Shelby received hot pack for 18 minutes to neck and back.      Home Exercises Provided and Patient Education Provided     Education provided: home exercise program     Written Home Exercises Provided: Patient instructed to cont prior HEP.  Exercises were reviewed and Shelby was able to demonstrate them prior to the end of the session.  Shelby demonstrated good  understanding of the education provided.     See EMR under Patient Instructions for exercises provided prior visit.    Assessment     Patient voicing decreased pain 4/10 after treatment.  Shelby Is progressing well towards her goals.   Pt prognosis is Good.     Pt will continue to benefit from skilled outpatient physical therapy to address the deficits listed in the problem list box on initial evaluation, provide pt/family education and to maximize pt's level of independence in the home and community environment.     Pt's spiritual, cultural and educational needs considered and pt agreeable to plan of care and goals.     Anticipated barriers to physical therapy: compliance with home exercise program     Goals:  Short Term Goals: 4 weeks   Pt will be independent with home ex program.   Pt will increase bilateral hip flexion to 110 degrees   Increase cervical rotation to 60 degrees bilaterally   Pt will decrease back pain from 8/10  to 6/10 to improve quality of life   Increase lower extremity from 3+/5 to 5/5 to increase functional mobility and endurance.       Long Term Goals: 6 weeks   Be able to grocery shop x 30 min pain free  Be able to return to yard work . Pain free     Plan     Plan of care Certification: 3/23/2023 to 4/22/2023.     Outpatient Physical Therapy 2 times weekly for 4 weeks to include the following interventions: Neuromuscular Re-ed, Patient Education, Therapeutic Activities, Therapeutic Exercise, and Ultrasound.    Lianne Howard, PTA  3/30/2023

## 2023-04-06 ENCOUNTER — CLINICAL SUPPORT (OUTPATIENT)
Dept: REHABILITATION | Facility: HOSPITAL | Age: 73
End: 2023-04-06
Payer: OTHER GOVERNMENT

## 2023-04-06 DIAGNOSIS — M54.2 CERVICAL PAIN: Primary | ICD-10-CM

## 2023-04-06 DIAGNOSIS — M25.69 DECREASED RANGE OF MOTION OF TRUNK AND BACK: ICD-10-CM

## 2023-04-06 DIAGNOSIS — R29.898 DECREASED RANGE OF MOTION OF NECK: ICD-10-CM

## 2023-04-06 PROCEDURE — 97014 ELECTRIC STIMULATION THERAPY: CPT | Mod: PN,CQ

## 2023-04-06 PROCEDURE — 97110 THERAPEUTIC EXERCISES: CPT | Mod: PN,CQ

## 2023-04-06 PROCEDURE — 97112 NEUROMUSCULAR REEDUCATION: CPT | Mod: PN,CQ

## 2023-04-06 NOTE — PROGRESS NOTES
"  Physical Therapy Treatment Note     Name: Shelby Salcedo  Clinic Number: [id number]    Therapy Diagnosis:   Encounter Diagnoses   Name Primary?    Cervical pain Yes    Decreased range of motion of neck      Physician: Order, Paper    Visit Date: 4/6/2023   Physician Orders: PT Eval and Treat    Medical Diagnosis from Referral: lumbar pain   Evaluation Date: 3/23/2023  Authorization Period Expiration: 04/22/2023  Plan of Care Expiration: 6 visits approved with va admin.   Visit # / Visits authorized: 3/ 6  PTA Visit #: 2    Time In: 115  Time Out: 210  Total Billable Time: 56 minutes    Precautions: Standard    Subjective     Pt reports: I cut grass for 4 hours yesterday and over did it a little  She was compliant with home exercise program.  Response to previous treatment: sore  Functional change: improved motion with adl's    Pain: 7/10  Location: bilateral back      Objective     Shelby received therapeutic exercises to develop strength, endurance, ROM, flexibility, posture, and core stabilization for 25 minutes including:  Bike x 5'  Swissball knee flexion x 10  Swissball trunk rotation x 10  Hip adduction with bolster x 10  Bilateral single knee to chest x 5  Bilateral piriformis x 5  Bilateral cervical rotation x 10      Range of motion   Hip flexion   right  96           left  96   Cervical rotation right  47    left  33       Shelby participated in neuromuscular re-education activities to improve: Kinesthetic and Posture for 15  minutes. The following activities were included:  Slant board x 2'  Bilateral hamstrings stretch on step x 5  Scapular retraction x 15  Upper body ergometer x 3"    Shelby received the following direct contact modalities after being cleared for contraindications:     Shelby received the following supervised modalities after being cleared for contradictions: IFC Electrical Stimulation:  Shelby received IFC Electrical Stimulation for pain control applied to the neck/back. Pt " received stimulation at 100 % scan at a frequency of 16 for 18 minutes. Shelby tolderated treatment well without any adverse effects.      Shelby received hot pack for 18 minutes to neck and back.      Home Exercises Provided and Patient Education Provided     Education provided: home exercise program     Written Home Exercises Provided: Patient instructed to cont prior HEP.  Exercises were reviewed and Shelby was able to demonstrate them prior to the end of the session.  Shelby demonstrated good  understanding of the education provided.     See EMR under Patient Instructions for exercises provided prior visit.    Assessment     Patient voicing decreased pain 3/10 after treatment   Shelby Is progressing well towards her goals.   Pt prognosis is Good.     Pt will continue to benefit from skilled outpatient physical therapy to address the deficits listed in the problem list box on initial evaluation, provide pt/family education and to maximize pt's level of independence in the home and community environment.     Pt's spiritual, cultural and educational needs considered and pt agreeable to plan of care and goals.     Anticipated barriers to physical therapy: compliance with home exercise program     Goals:  Short Term Goals: 4 weeks   Pt will be independent with home ex program.   Pt will increase bilateral hip flexion to 110 degrees   Increase cervical rotation to 60 degrees bilaterally   Pt will decrease back pain from 8/10  to 6/10 to improve quality of life   Increase lower extremity from 3+/5 to 5/5 to increase functional mobility and endurance.       Long Term Goals: 6 weeks   Be able to grocery shop x 30 min pain free  Be able to return to yard work . Pain free     Plan     Plan of care Certification: 3/23/2023 to 4/22/2023.     Outpatient Physical Therapy 2 times weekly for 4 weeks to include the following interventions: Neuromuscular Re-ed, Patient Education, Therapeutic Activities, Therapeutic Exercise,  and Ultrasound.    Lianne Howard, PTA  4/6/2023

## 2023-04-10 ENCOUNTER — HOSPITAL ENCOUNTER (OUTPATIENT)
Dept: RADIOLOGY | Facility: HOSPITAL | Age: 73
Discharge: HOME | End: 2023-04-10
Payer: MEDICARE

## 2023-04-10 VITALS — BODY MASS INDEX: 29.02 KG/M2 | HEIGHT: 64 IN | WEIGHT: 170 LBS

## 2023-04-10 DIAGNOSIS — Z12.31 OTHER SCREENING MAMMOGRAM: ICD-10-CM

## 2023-04-10 PROCEDURE — 77067 SCR MAMMO BI INCL CAD: CPT | Mod: TC

## 2023-04-13 ENCOUNTER — CLINICAL SUPPORT (OUTPATIENT)
Dept: REHABILITATION | Facility: HOSPITAL | Age: 73
End: 2023-04-13
Payer: OTHER GOVERNMENT

## 2023-04-13 DIAGNOSIS — M54.2 CERVICAL PAIN: Primary | ICD-10-CM

## 2023-04-13 DIAGNOSIS — R29.898 DECREASED RANGE OF MOTION OF NECK: ICD-10-CM

## 2023-04-13 PROCEDURE — 97014 ELECTRIC STIMULATION THERAPY: CPT | Mod: PN,CQ

## 2023-04-13 PROCEDURE — 97110 THERAPEUTIC EXERCISES: CPT | Mod: PN,CQ

## 2023-04-13 PROCEDURE — 97112 NEUROMUSCULAR REEDUCATION: CPT | Mod: PN,CQ

## 2023-04-13 NOTE — PROGRESS NOTES
"  Physical Therapy Treatment Note     Name: Shelby Salcedo  Clinic Number: [id number]    Therapy Diagnosis:   No diagnosis found.    Physician: Order, Paper    Visit Date: 4/13/2023   Physician Orders: PT Eval and Treat    Medical Diagnosis from Referral: lumbar pain   Evaluation Date: 3/23/2023  Authorization Period Expiration: 04/22/2023  Plan of Care Expiration: [facility] visits [facility]   Visit # / Visits authorized: 4/ 6  PTA Visit #: 3    Time In: 115  Time Out: 208  Total Billable Time: 53 minutes    Precautions: Standard    Subjective     Pt reports: I have walking pneumonia and not feeling good. I had a mammogram on Monday, my shoulders have been hurting since then.  She was compliant with home exercise program.  Response to previous treatment: sore  Functional change: improved motion with adl's    Pain: 8/10  Location: bilateral back      Objective     Shelby received therapeutic exercises to develop strength, endurance, ROM, flexibility, posture, and core stabilization for 25 minutes including:  Bike x 5'  Swissball knee flexion x 10  Swissball trunk rotation x 10  Hip adduction with bolster x 10  Bilateral cervical rotation x 10  Bilateral trunk side bend x 5      Range of motion   Hip flexion   right  96           left  96   Cervical rotation right  47    left  33       Shelby participated in neuromuscular re-education activities to improve: Kinesthetic and Posture for 10  minutes. The following activities were included:  Slant board x 2'  Bilateral hamstrings stretch on step x 5  Scapular retraction x 15  Upper body ergometer x 3"  Doorway pectoralis stretch x 5    Shelby received the following direct contact modalities after being cleared for contraindications:     Shelby received the following supervised modalities after being cleared for contradictions: IFC Electrical Stimulation:  Shelby received IFC Electrical Stimulation for pain control applied to the neck/back. Pt received stimulation " at 100 % scan at a frequency of 16 for 18 minutes. Shelby tolderated treatment well without any adverse effects.      Shelby received hot pack for 18 minutes to neck and back.      Home Exercises Provided and Patient Education Provided     Education provided: home exercise program     Written Home Exercises Provided: Patient instructed to cont prior HEP.  Exercises were reviewed and Shelby was able to demonstrate them prior to the end of the session.  Shelby demonstrated good  understanding of the education provided.     See EMR under Patient Instructions for exercises provided prior visit.    Assessment     Patient voicing decreased pain 4/10 after treatment   Shelby Is progressing well towards her goals.   Pt prognosis is Good.     Pt will continue to benefit from skilled outpatient physical therapy to address the deficits listed in the problem list box on initial evaluation, provide pt/family education and to maximize pt's level of independence in the home and community environment.     Pt's spiritual, cultural and educational needs considered and pt agreeable to plan of care and goals.     Anticipated barriers to physical therapy: compliance with home exercise program     Goals:  Short Term Goals: 4 weeks   Pt will be independent with home ex program.   Pt will increase bilateral hip flexion to 110 degrees   Increase cervical rotation to 60 degrees bilaterally   Pt will decrease back pain from 8/10  to 6/10 to improve quality of life   Increase lower extremity from 3+/5 to 5/5 to increase functional mobility and endurance.       Long Term Goals: 6 weeks   Be able to grocery shop x 30 min pain free  Be able to return to yard work . Pain free     Plan     Plan of care Certification: 3/23/2023 to 4/22/2023.     Outpatient Physical Therapy 2 times weekly for 4 weeks to include the following interventions: Neuromuscular Re-ed, Patient Education, Therapeutic Activities, Therapeutic Exercise, and  Ultrasound.    Lianne Howard, PTA  4/13/2023

## 2023-04-20 ENCOUNTER — CLINICAL SUPPORT (OUTPATIENT)
Dept: REHABILITATION | Facility: HOSPITAL | Age: 73
End: 2023-04-20
Payer: OTHER GOVERNMENT

## 2023-04-20 DIAGNOSIS — M54.2 CERVICAL PAIN: Primary | ICD-10-CM

## 2023-04-20 DIAGNOSIS — R29.898 DECREASED RANGE OF MOTION OF NECK: ICD-10-CM

## 2023-04-20 PROCEDURE — 97110 THERAPEUTIC EXERCISES: CPT | Mod: PN,CQ

## 2023-04-20 PROCEDURE — 97014 ELECTRIC STIMULATION THERAPY: CPT | Mod: PN,CQ

## 2023-04-20 PROCEDURE — 97112 NEUROMUSCULAR REEDUCATION: CPT | Mod: PN,CQ

## 2023-04-20 NOTE — PROGRESS NOTES
Physical Therapy Treatment Note     Name: Shelby Salcedo  Clinic Number: [id number]    Therapy Diagnosis:   No diagnosis found.    Physician: Order, Paper    Visit Date: 4/20/2023   Physician Orders: PT Eval and Treat    Medical Diagnosis from Referral: lumbar pain   Evaluation Date: 3/23/2023  Authorization Period Expiration: 04/22/2023  Plan of Care Expiration: 6 visits [facility] with [facility] admin.   Visit # / Visits authorized: 5/ 6  PTA Visit #: 4    Time In: 115  Time Out: 205  Total Billable Time: 50 minutes    Precautions: Standard  Patient request to not perform stationary bike anymore  Subjective     Pt reports: I'm breathing better and feel better than I have in a long time. I don't want to ride the bike anymore, it messes my knees up.  She was compliant with home exercise program.  Response to previous treatment: sore  Functional change: improved motion with adl's    Pain: 6/10  Location: bilateral back      Objective     Shelby received therapeutic exercises to develop strength, endurance, ROM, flexibility, posture, and core stabilization for 20 minutes including:    Swissball knee flexion x 10  Swissball trunk rotation x 10  Hip adduction with bolster x 10  Bilateral cervical rotation x 10  Bilateral trunk side bend x 5    Range of motion   Hip flexion   right  97           left  97   Cervical rotation right  47    left  35       Shelby participated in neuromuscular re-education activities to improve: Kinesthetic and Posture for 10  minutes. The following activities were included:  Slant board x 2'  Bilateral hamstrings stretch on step x 5  Scapular retraction x 15  Upper body ergometer x 5'  Doorway pectoralis stretch x 5    Shelby received the following direct contact modalities after being cleared for contraindications:     Shelby received the following supervised modalities after being cleared for contradictions: IFC Electrical Stimulation:  Shelby received IFC Electrical Stimulation for pain  control applied to the neck/back. Pt received stimulation at 100 % scan at a frequency of 16 for 18 minutes. Shelby tolderated treatment well without any adverse effects.      Shelby received hot pack for 18 minutes to neck and back.      Home Exercises Provided and Patient Education Provided     Education provided: home exercise program     Written Home Exercises Provided: Patient instructed to cont prior HEP.  Exercises were reviewed and Shelby was able to demonstrate them prior to the end of the session.  Shelby demonstrated good  understanding of the education provided.     See EMR under Patient Instructions for exercises provided prior visit.    Assessment     Patient voicing decreased pain 3/10 after treatment. Patient progressing with range of motion.  Shelby Is progressing well towards her goals.   Pt prognosis is Good.     Pt will continue to benefit from skilled outpatient physical therapy to address the deficits listed in the problem list box on initial evaluation, provide pt/family education and to maximize pt's level of independence in the home and community environment.     Pt's spiritual, cultural and educational needs considered and pt agreeable to plan of care and goals.     Anticipated barriers to physical therapy: compliance with home exercise program     Goals:  Short Term Goals: 4 weeks   Pt will be independent with home ex program.   Pt will increase bilateral hip flexion to 110 degrees   Increase cervical rotation to 60 degrees bilaterally   Pt will decrease back pain from 8/10  to 6/10 to improve quality of life   Increase lower extremity from 3+/5 to 5/5 to increase functional mobility and endurance.       Long Term Goals: 6 weeks   Be able to grocery shop x 30 min pain free  Be able to return to yard work . Pain free     Plan     Plan of care Certification: 3/23/2023 to 4/22/2023.     Outpatient Physical Therapy 2 times weekly for 4 weeks to include the following interventions:  Neuromuscular Re-ed, Patient Education, Therapeutic Activities, Therapeutic Exercise, and Ultrasound.    Lianne Howard, PTA  4/20/2023

## 2023-04-27 ENCOUNTER — CLINICAL SUPPORT (OUTPATIENT)
Dept: REHABILITATION | Facility: HOSPITAL | Age: 73
End: 2023-04-27
Payer: OTHER GOVERNMENT

## 2023-04-27 DIAGNOSIS — G89.29 CHRONIC BILATERAL LOW BACK PAIN WITH RIGHT-SIDED SCIATICA: ICD-10-CM

## 2023-04-27 DIAGNOSIS — R29.898 DECREASED RANGE OF MOTION OF NECK: ICD-10-CM

## 2023-04-27 DIAGNOSIS — M54.41 CHRONIC BILATERAL LOW BACK PAIN WITH RIGHT-SIDED SCIATICA: ICD-10-CM

## 2023-04-27 DIAGNOSIS — M54.2 CERVICAL PAIN: Primary | ICD-10-CM

## 2023-04-27 PROBLEM — M54.40 CHRONIC BILATERAL LOW BACK PAIN WITH SCIATICA: Status: RESOLVED | Noted: 2022-02-14 | Resolved: 2023-04-27

## 2023-04-27 PROBLEM — M54.42 CHRONIC BILATERAL LOW BACK PAIN WITH SCIATICA: Status: RESOLVED | Noted: 2022-02-14 | Resolved: 2023-04-27

## 2023-04-27 PROCEDURE — 97014 ELECTRIC STIMULATION THERAPY: CPT | Mod: PN

## 2023-04-27 PROCEDURE — 97110 THERAPEUTIC EXERCISES: CPT | Mod: PN

## 2023-04-27 PROCEDURE — 97112 NEUROMUSCULAR REEDUCATION: CPT | Mod: PN

## 2023-04-27 NOTE — PLAN OF CARE
Physical Therapy Treatment Note     Name: Shelby Salcedo  Clinic Number: [id number]    Therapy Diagnosis:   Encounter Diagnoses   Name Primary?    Cervical pain Yes    Chronic bilateral low back pain with right-sided sciatica     Decreased range of motion of neck        Physician: Rosy Walls     Visit Date: 4/27/2023   Physician Orders: PT Eval and Treat    Medical Diagnosis from Referral: lumbar pain   Evaluation Date: 3/23/2023  Authorization Period Expiration: 04/22/2023  Plan of Care Expiration: 6 visits [facility] admin.   Visit # / Visits authorized: 6/ 6  PTA Visit #: 4    Time In: 115  Time Out: 205  Total Billable Time: 50 minutes    Precautions: Standard  Patient request to not perform stationary bike anymore  Subjective     Pt reports: I'm breathing better and feel better than I have in a long time. I don't want to ride the bike anymore, it messes my knees up.  She was compliant with home exercise program.  Response to previous treatment: sore  Functional change: improved motion with adl's    Pain: 6/10  Location: bilateral back      Objective     Shelby received therapeutic exercises to develop strength, endurance, ROM, flexibility, posture, and core stabilization for 20 minutes including:    Swissball knee flexion x 10  Swissball trunk rotation x 10  Hip adduction with bolster x 10  Bilateral cervical rotation x 10  Bilateral trunk side bend x 5    Range of motion   Hip flexion   right  97           left  97   Cervical rotation right  47    left  35       Shelby participated in neuromuscular re-education activities to improve: Kinesthetic and Posture for 10  minutes. The following activities were included:  Slant board x 2'  Bilateral hamstrings stretch on step x 5  Scapular retraction x 15  Upper body ergometer x 5'  Doorway pectoralis stretch x 5    Shelby received the following direct contact modalities after being cleared for contraindications:     Shelby received the following  supervised modalities after being cleared for contradictions: IFC Electrical Stimulation:  Shelby received IFC Electrical Stimulation for pain control applied to the neck/back. Pt received stimulation at 100 % scan at a frequency of 16 for 18 minutes. Shelby tolderated treatment well without any adverse effects.      Shelby received hot pack for 18 minutes to neck and back.      Home Exercises Provided and Patient Education Provided     Education provided: home exercise program     Written Home Exercises Provided: Patient instructed to cont prior HEP.  Exercises were reviewed and Shelby was able to demonstrate them prior to the end of the session.  Shelby demonstrated good  understanding of the education provided.     See EMR under Patient Instructions for exercises provided prior visit.    Assessment     Patient voicing decreased pain 3/10 after treatment. Patient progressing with range of motion.  Shelby Is progressing well towards her goals.   Pt prognosis is Good.     Pt will continue to benefit from skilled outpatient physical therapy to address the deficits listed in the problem list box on initial evaluation, provide pt/family education and to maximize pt's level of independence in the home and community environment.     Pt's spiritual, cultural and educational needs considered and pt agreeable to plan of care and goals.     Anticipated barriers to physical therapy: compliance with home exercise program     Goals:  Short Term Goals: 4 weeks   Pt will be independent with home ex program.   Pt will increase bilateral hip flexion to 110 degrees   Increase cervical rotation to 60 degrees bilaterally   Pt will decrease back pain from 8/10  to 6/10 to improve quality of life   Increase lower extremity from 3+/5 to 5/5 to increase functional mobility and endurance.       Long Term Goals: 6 weeks   Be able to grocery shop x 30 min pain free  Be able to return to yard work . Pain free     Plan     Outpatient  Therapy Discharge Summary     Name: Shelby Salcedo  [facility] Number: [id number]    Therapy Diagnosis:   Encounter Diagnoses   Name Primary?    Cervical pain Yes    Chronic bilateral low back pain with right-sided sciatica     Decreased range of motion of neck                Assessment    Goals:  Pt met some goals . Pt still c/o of chronic back and neck pain, voices she is not much better    Discharge reason: Patient has completed the physician's prescription and Patient has reached the maximum rehab potential for the present time    Plan   This patient is discharged from Physical Therapy.    Dakota Canales, PT

## 2023-04-27 NOTE — PROGRESS NOTES
Physical Therapy Treatment Note     Name: Shelby Salcedo  Clinic Number: [id number]    Therapy Diagnosis:   Encounter Diagnoses   Name Primary?    Cervical pain Yes    Chronic bilateral low back pain with right-sided sciatica     Decreased range of motion of neck        Physician: Order, Paper    Visit Date: 4/27/2023   Physician Orders: PT Eval and Treat    Medical Diagnosis from Referral: lumbar pain   Evaluation Date: 3/23/2023  Authorization Period Expiration: 04/22/2023  Plan of Care Expiration: 6 visits [facility].   Visit # / Visits authorized: 6/ 6  PTA Visit #: 4    Time In: 115  Time Out: 205  Total Billable Time: 50 minutes    Precautions: Standard  Patient request to not perform stationary bike anymore  Subjective     Pt reports: I'm breathing better and feel better than I have in a long time. I don't want to ride the bike anymore, it messes my knees up.  She was compliant with home exercise program.  Response to previous treatment: sore  Functional change: improved motion with adl's    Pain: 6/10  Location: bilateral back      Objective     Shelby received therapeutic exercises to develop strength, endurance, ROM, flexibility, posture, and core stabilization for 20 minutes including:    Swissball knee flexion x 10  Swissball trunk rotation x 10  Hip adduction with bolster x 10  Bilateral cervical rotation x 10  Bilateral trunk side bend x 5    Range of motion   Hip flexion   right  97           left  97   Cervical rotation right  47    left  35       Shelby participated in neuromuscular re-education activities to improve: Kinesthetic and Posture for 10  minutes. The following activities were included:  Slant board x 2'  Bilateral hamstrings stretch on step x 5  Scapular retraction x 15  Upper body ergometer x 5'  Doorway pectoralis stretch x 5    Shelby received the following direct contact modalities after being cleared for contraindications:     Shelby received the following  supervised modalities after being cleared for contradictions: IFC Electrical Stimulation:  Shelby received IFC Electrical Stimulation for pain control applied to the neck/back. Pt received stimulation at 100 % scan at a frequency of 16 for 18 minutes. Shelby tolderated treatment well without any adverse effects.      Shelby received hot pack for 18 minutes to neck and back.      Home Exercises Provided and Patient Education Provided     Education provided: home exercise program     Written Home Exercises Provided: Patient instructed to cont prior HEP.  Exercises were reviewed and Shelby was able to demonstrate them prior to the end of the session.  Shelby demonstrated good  understanding of the education provided.     See EMR under Patient Instructions for exercises provided prior visit.    Assessment     Patient voicing decreased pain 3/10 after treatment. Patient progressing with range of motion.  Shelby Is progressing well towards her goals.   Pt prognosis is Good.     Pt will continue to benefit from skilled outpatient physical therapy to address the deficits listed in the problem list box on initial evaluation, provide pt/family education and to maximize pt's level of independence in the home and community environment.     Pt's spiritual, cultural and educational needs considered and pt agreeable to plan of care and goals.     Anticipated barriers to physical therapy: compliance with home exercise program     Goals:  Short Term Goals: 4 weeks   Pt will be independent with home ex program.   Pt will increase bilateral hip flexion to 110 degrees   Increase cervical rotation to 60 degrees bilaterally   Pt will decrease back pain from 8/10  to 6/10 to improve quality of life   Increase lower extremity from 3+/5 to 5/5 to increase functional mobility and endurance.       Long Term Goals: 6 weeks   Be able to grocery shop x 30 min pain free  Be able to return to yard work . Pain free     Plan     Plan of care  Certification: 3/23/2023 to 5/11/2023 approved with [facility]   Outpatient Physical Therapy 2 times weekly for 4 weeks to include the following interventions: Neuromuscular Re-ed, Patient Education, Therapeutic Activities, Therapeutic Exercise, and Ultrasound.      Dakota Canales, PT  4/27/2023

## 2023-06-12 ENCOUNTER — HOSPITAL ENCOUNTER (OUTPATIENT)
Dept: RADIOLOGY | Facility: HOSPITAL | Age: 73
Discharge: HOME | End: 2023-06-12
Attending: PAIN MEDICINE
Payer: MEDICARE

## 2023-06-12 ENCOUNTER — OFFICE VISIT (OUTPATIENT)
Dept: PAIN MEDICINE | Facility: CLINIC | Age: 73
End: 2023-06-12
Payer: OTHER GOVERNMENT

## 2023-06-12 VITALS
WEIGHT: 174 LBS | RESPIRATION RATE: 18 BRPM | HEIGHT: 64 IN | DIASTOLIC BLOOD PRESSURE: 74 MMHG | SYSTOLIC BLOOD PRESSURE: 146 MMHG | BODY MASS INDEX: 29.71 KG/M2 | HEART RATE: 64 BPM

## 2023-06-12 DIAGNOSIS — G89.29 CHRONIC PAIN OF BOTH SHOULDERS: ICD-10-CM

## 2023-06-12 DIAGNOSIS — M54.42 CHRONIC BILATERAL LOW BACK PAIN WITH BILATERAL SCIATICA: ICD-10-CM

## 2023-06-12 DIAGNOSIS — M54.41 CHRONIC BILATERAL LOW BACK PAIN WITH BILATERAL SCIATICA: ICD-10-CM

## 2023-06-12 DIAGNOSIS — M54.2 CERVICALGIA: ICD-10-CM

## 2023-06-12 DIAGNOSIS — G89.29 CHRONIC BILATERAL LOW BACK PAIN WITH BILATERAL SCIATICA: ICD-10-CM

## 2023-06-12 DIAGNOSIS — M25.512 CHRONIC PAIN OF BOTH SHOULDERS: ICD-10-CM

## 2023-06-12 DIAGNOSIS — M46.1 BILATERAL SACROILIITIS: ICD-10-CM

## 2023-06-12 DIAGNOSIS — M25.511 CHRONIC PAIN OF BOTH SHOULDERS: ICD-10-CM

## 2023-06-12 DIAGNOSIS — Z79.899 ENCOUNTER FOR LONG-TERM (CURRENT) USE OF OTHER MEDICATIONS: Primary | ICD-10-CM

## 2023-06-12 LAB

## 2023-06-12 PROCEDURE — 72202 XR SACROILIAC JOINTS COMPLETE: ICD-10-PCS | Mod: 26,,, | Performed by: RADIOLOGY

## 2023-06-12 PROCEDURE — 72070 XR THORACIC SPINE AP LATERAL: ICD-10-PCS | Mod: 26,,, | Performed by: RADIOLOGY

## 2023-06-12 PROCEDURE — G0481 PR DRUG TEST DEF 8-14 CLASSES: ICD-10-PCS | Mod: ,,, | Performed by: CLINICAL MEDICAL LABORATORY

## 2023-06-12 PROCEDURE — 72050 X-RAY EXAM NECK SPINE 4/5VWS: CPT | Mod: TC

## 2023-06-12 PROCEDURE — 72114 X-RAY EXAM L-S SPINE BENDING: CPT | Mod: TC

## 2023-06-12 PROCEDURE — 72050 XR CERVICAL SPINE AP LAT WITH FLEX EXTEN: ICD-10-PCS | Mod: 26,,, | Performed by: STUDENT IN AN ORGANIZED HEALTH CARE EDUCATION/TRAINING PROGRAM

## 2023-06-12 PROCEDURE — G0481 DRUG TEST DEF 8-14 CLASSES: HCPCS | Mod: ,,, | Performed by: CLINICAL MEDICAL LABORATORY

## 2023-06-12 PROCEDURE — 72202 X-RAY EXAM SI JOINTS 3/> VWS: CPT | Mod: TC

## 2023-06-12 PROCEDURE — 99215 OFFICE O/P EST HI 40 MIN: CPT | Mod: PBBFAC | Performed by: PAIN MEDICINE

## 2023-06-12 PROCEDURE — 72050 X-RAY EXAM NECK SPINE 4/5VWS: CPT | Mod: 26,,, | Performed by: STUDENT IN AN ORGANIZED HEALTH CARE EDUCATION/TRAINING PROGRAM

## 2023-06-12 PROCEDURE — 72114 XR LUMBAR SPINE 5 VIEW WITH FLEX AND EXT: ICD-10-PCS | Mod: 26,,, | Performed by: RADIOLOGY

## 2023-06-12 PROCEDURE — 99203 PR OFFICE/OUTPT VISIT, NEW, LEVL III, 30-44 MIN: ICD-10-PCS | Mod: S$PBB,,, | Performed by: PAIN MEDICINE

## 2023-06-12 PROCEDURE — 72114 X-RAY EXAM L-S SPINE BENDING: CPT | Mod: 26,,, | Performed by: RADIOLOGY

## 2023-06-12 PROCEDURE — 72070 X-RAY EXAM THORAC SPINE 2VWS: CPT | Mod: TC

## 2023-06-12 PROCEDURE — 99203 OFFICE O/P NEW LOW 30 MIN: CPT | Mod: S$PBB,,, | Performed by: PAIN MEDICINE

## 2023-06-12 PROCEDURE — 72202 X-RAY EXAM SI JOINTS 3/> VWS: CPT | Mod: 26,,, | Performed by: RADIOLOGY

## 2023-06-12 PROCEDURE — 72070 X-RAY EXAM THORAC SPINE 2VWS: CPT | Mod: 26,,, | Performed by: RADIOLOGY

## 2023-06-12 PROCEDURE — 80305 DRUG TEST PRSMV DIR OPT OBS: CPT | Mod: PBBFAC | Performed by: PAIN MEDICINE

## 2023-06-12 NOTE — PROGRESS NOTES
"Chronic Pain - New Consult    Referring Physician: Caren Marr MD       SUBJECTIVE: Disclaimer: This note has been generated using voice-recognition software. There may be typographical errors that have been missed during proof-reading      Initial encounter:    Shelby Salcedo presents to the clinic for the evaluation of cervical, bilateral shoulder and lower back pain.       72-year-old female presents for new patient evaluation and consultation from Stacey Chapa NP.  Patient reports  cervical, thoracic lumbar and bilateral hip pain since 2017.  She has  a long history of scoliosis with worsening over time.  The cervical pain radiates to both shoulders and is her primary complaint.  She denies radicular symptoms to the arms, hands or fingers.  She denies paresthesia or weakness of the upper extremities. She also complains of lower back pain with radicular symptoms to both hips, lower extremities and calves. She denies paresthesia, weakness, bowel or bladder involvement.  She has not received x-rays or MRIs of the cervical, thoracic or lumbar spine.  Physical therapy in [place], [place] 2023 provided minimal improvement.  Pain Assessment  Pain Assessment: 0-10  Pain Score:   8  Pain Location: Back (upper- lower back pain and bilateral shoulder pain)  Pain Descriptors: Aching  Pain Frequency: Constant/continuous  Pain Onset: Awakened from sleep  Aggravating Factors: Standing, Bending  Pain Intervention(s): Medication (See eMAR), Home medication, Heat applied      Physical Therapy/Home Exercise: yes        Pain Medications:  currently has no medications in their medication list.      Tried in Past:  NSAIDS-yes  TCA-no  SNRI-no  Anti-convulsants-no  Muscle Relaxants-no  Opioids-no  Benzodiazepines-no     4A"s of Opioid Risk Assessment  Activity: Patient has difficulty performing ADL  Analgesia:  Patient's pain is partially controlled by current medication.   Aberrant Behavior:  reviewed with no aberrant " drug seeking/taking behavior     report:  Reviewed and consistent with medication use as prescribed.    Patient denies suicidal or homicidal ideations    Pain interventional therapy-no    Chiropractor -no  Acupuncture - no  TENS unit -no  Spinal decompression -no  Joint replacement -no     Review of Systems   Constitutional: Negative.    HENT: Negative.     Eyes: Negative.    Respiratory: Negative.     Cardiovascular: Negative.    Gastrointestinal: Negative.    Endocrine: Negative.    Genitourinary: Negative.    Musculoskeletal:  Positive for arthralgias, back pain, gait problem, neck pain and neck stiffness.   Integumentary:  Negative.   Hematological: Negative.    Psychiatric/Behavioral: Negative.             Mammo Digital Screening Bilat  Narrative: Result:   Mammo Digital Screening Bilat     History:  Patient is 72 y.o. and is seen for a screening mammogram.    Films Compared:  Prior images (if available) were compared.     Findings:  The breasts have scattered areas of fibroglandular density. There is no   evidence of suspicious masses, calcifications, or other abnormal findings.  Impression: Bilateral  There is no mammographic evidence of malignancy.    BI-RADS Category:   Overall: 1 - Negative       Recommendation:  Routine screening mammogram in 1 year is recommended.    Your estimated lifetime risk of breast cancer (to age 85) based on   Tyrer-Cuzick risk assessment model is Tyrer-Cuzick: 2.51 %. According to   the American Cancer Society, patients with a lifetime breast cancer risk   of 20% or higher might benefit from supplemental screening tests.         History reviewed. No pertinent past medical history.  Past Surgical History:   Procedure Laterality Date    BUNIONECTOMY Bilateral     HYSTERECTOMY      OOPHORECTOMY      ROTATOR CUFF REPAIR Right      Social History     Socioeconomic History    Marital status:    Tobacco Use    Smoking status: Never    Smokeless tobacco: Never   Substance and  "Sexual Activity    Alcohol use: Never    Drug use: Never     No family history on file.  Review of patient's allergies indicates:  No Known Allergies      OBJECTIVE:  Vitals:    06/12/23 1253   BP: (!) 146/74   Pulse: 64   Resp: 18     BP (!) 146/74   Pulse 64   Resp 18   Ht 5' 4" (1.626 m)   Wt 78.9 kg (174 lb)   BMI 29.87 kg/m²   Physical Exam  Vitals and nursing note reviewed.   Constitutional:       General: She is not in acute distress.     Appearance: Normal appearance. She is not ill-appearing, toxic-appearing or diaphoretic.   HENT:      Head: Normocephalic and atraumatic.      Nose: Nose normal.      Mouth/Throat:      Mouth: Mucous membranes are moist.   Eyes:      Extraocular Movements: Extraocular movements intact.      Pupils: Pupils are equal, round, and reactive to light.   Cardiovascular:      Rate and Rhythm: Normal rate and regular rhythm.      Heart sounds: Normal heart sounds.   Pulmonary:      Effort: Pulmonary effort is normal. No respiratory distress.      Breath sounds: Normal breath sounds. No stridor. No wheezing or rhonchi.   Abdominal:      General: Bowel sounds are normal.      Palpations: Abdomen is soft.   Musculoskeletal:         General: No swelling or deformity.      Cervical back: Spasms and tenderness present. Pain with movement present. Decreased range of motion.      Thoracic back: Scoliosis present.      Lumbar back: Tenderness and bony tenderness present. No spasms. Decreased range of motion. Negative right straight leg raise test and negative left straight leg raise test. Scoliosis present.      Right lower leg: No edema.      Left lower leg: No edema.      Comments: Cervical and lumbar pain with flexion, extension and lateral rotation.  Bilateral cervical and lumbar facet tenderness to palpation   Skin:     General: Skin is warm.   Neurological:      General: No focal deficit present.      Mental Status: She is alert and oriented to person, place, and time. Mental " status is at baseline.      Cranial Nerves: No cranial nerve deficit.      Sensory: Sensation is intact. No sensory deficit.      Motor: No weakness.      Coordination: Coordination normal.      Gait: Gait normal.      Deep Tendon Reflexes: Reflexes are normal and symmetric.   Psychiatric:         Mood and Affect: Mood normal.         Behavior: Behavior normal.          ASSESSMENT: 72 y.o. year old female with pain, consistent with     Encounter Diagnoses   Name Primary?    Encounter for long-term (current) use of other medications Yes    Chronic bilateral low back pain with bilateral sciatica     Chronic pain of both shoulders     Cervicalgia     Bilateral sacroiliitis         PLAN:   1. reviewed  2..Addiction, Dependency, Tolerance, Opioid abuse-misuse, Death, Diversion Discussed. Overdose reversal drug Naloxone discussed  3. Opioid contract signed today  4.Refill/ Continue medications for pain control and function       Requested Prescriptions      No prescriptions requested or ordered in this encounter     5. Obtain x-ray of the cervical, thoracic, lumbar spine and SI joints  6.Urine drug screen and confirmation testing was ordered as documented on the requisition form in order to verify medication compliance, test for illicit substances.  7. Start physical therapy 2 to 3 times week x6 weeks for cervical and lumbar pain    Orders Placed This Encounter   Procedures    X-Ray Cervical Spine AP Lat with Flexion  Extension     Standing Status:   Future     Standing Expiration Date:   6/12/2024     Order Specific Question:   May the Radiologist modify the order per protocol to meet the clinical needs of the patient?     Answer:   Yes     Order Specific Question:   Release to patient     Answer:   Immediate    X-Ray Thoracic Spine AP Lateral     Standing Status:   Future     Standing Expiration Date:   6/12/2024     Order Specific Question:   May the Radiologist modify the order per protocol to meet the clinical  needs of the patient?     Answer:   Yes     Order Specific Question:   Release to patient     Answer:   Immediate    X-Ray Lumbar Complete Including Flex And Ext     Standing Status:   Future     Standing Expiration Date:   6/12/2024     Order Specific Question:   May the Radiologist modify the order per protocol to meet the clinical needs of the patient?     Answer:   Yes    X-Ray Sacroiliac Joints Complete     Standing Status:   Future     Standing Expiration Date:   6/12/2024     Order Specific Question:   May the Radiologist modify the order per protocol to meet the clinical needs of the patient?     Answer:   Yes     Order Specific Question:   Release to patient     Answer:   Immediate    Drug Screen Definitive 14, Urine     Standing Status:   Future     Number of Occurrences:   1     Standing Expiration Date:   8/10/2024     Order Specific Question:   Specimen Source     Answer:   Urine    Ambulatory referral/consult to Physical/Occupational Therapy     Standing Status:   Future     Standing Expiration Date:   7/12/2024     Referral Priority:   Routine     Referral Type:   Physical Medicine     Referral Reason:   Specialty Services Required     Requested Specialty:   Physical Therapy     Number of Visits Requested:   1    POCT Urine Drug Screen ([facility])     Interpretive Information:     Negative:  No drug detected at the cut off level.   Positive:  This result represents presumptive positive for the   tested drug, other substances may yield a positive response other   than the analyte of interest. This result should be utilized for   diagnostic purpose only. Confirmation testing will be performed upon physician request only.         8. Consider MRI of the cervical and lumbar spine after completion of physical therapy and if indicated  9. Return in 1 month for re-evaluation and treatment options    The total time spent for evaluation and management on 06/12/2023 including reviewing separately obtained  history, performing a medically appropriate exam and evaluation, documenting clinical information in the health record, independently interpreting results and communicating them to the patient/family/caregiver, and ordering medications/tests/procedures was between 15-29 minutes.    The above plan and management options were discussed at length with patient. Patient is in agreement with the above and verbalized understanding. It will be communicated with the referring physician via electronic record, fax, or mail.    Caren Marr  06/12/2023

## 2023-06-19 LAB
6-ACETYLMORPHINE, URINE (RUSH): NEGATIVE 10 NG/ML
7-AMINOCLONAZEPAM, URINE (RUSH): NEGATIVE 25 NG/ML
A-HYDROXYALPRAZOLAM, URINE (RUSH): NEGATIVE 25 NG/ML
ACETYL FENTANYL, URINE (RUSH): NEGATIVE 2.5 NG/ML
ACETYL NORFENTANYL OXALATE, URINE (RUSH): NEGATIVE 5 NG/ML
AMPHET UR QL SCN: NEGATIVE
BENZOYLECGONINE, URINE (RUSH): NEGATIVE 100 NG/ML
BUPRENORPHINE UR QL SCN: NEGATIVE 25 NG/ML
CODEINE, URINE (RUSH): NEGATIVE 25 NG/ML
CREAT UR-MCNC: 129 MG/DL (ref 28–219)
EDDP, URINE (RUSH): NEGATIVE 25 NG/ML
FENTANYL, URINE (RUSH): NEGATIVE 2.5 NG/ML
HYDROCODONE, URINE (RUSH): NEGATIVE 25 NG/ML
HYDROMORPHONE, URINE (RUSH): NEGATIVE 25 NG/ML
LORAZEPAM, URINE (RUSH): NEGATIVE 25 NG/ML
METHADONE UR QL SCN: NEGATIVE 25 NG/ML
METHAMPHET UR QL SCN: NEGATIVE
MORPHINE, URINE (RUSH): NEGATIVE 25 NG/ML
NORBUPRENORPHINE, URINE (RUSH): NEGATIVE 25 NG/ML
NORDIAZEPAM, URINE (RUSH): NEGATIVE 25 NG/ML
NORFENTANYL OXALATE, URINE (RUSH): NEGATIVE 5 NG/ML
NORHYDROCODONE, URINE (RUSH): NEGATIVE 50 NG/ML
NOROXYCODONE HCL, URINE (RUSH): NEGATIVE 50 NG/ML
OXAZEPAM, URINE (RUSH): NEGATIVE 25 NG/ML
OXYCODONE UR QL SCN: NEGATIVE 25 NG/ML
OXYMORPHONE, URINE (RUSH): NEGATIVE 25 NG/ML
PH UR STRIP: 5.5 PH UNITS
SP GR UR STRIP: 1.02
TAPENTADOL, URINE (RUSH): NEGATIVE 25 NG/ML
TEMAZEPAM, URINE (RUSH): NEGATIVE 25 NG/ML
THC-COOH, URINE (RUSH): NEGATIVE 25 NG/ML
TRAMADOL, URINE (RUSH): NEGATIVE 100 NG/ML

## 2023-07-12 ENCOUNTER — OFFICE VISIT (OUTPATIENT)
Dept: PAIN MEDICINE | Facility: CLINIC | Age: 73
End: 2023-07-12
Payer: MEDICARE

## 2023-07-12 VITALS
DIASTOLIC BLOOD PRESSURE: 83 MMHG | RESPIRATION RATE: 18 BRPM | SYSTOLIC BLOOD PRESSURE: 119 MMHG | WEIGHT: 177 LBS | HEIGHT: 64 IN | BODY MASS INDEX: 30.22 KG/M2 | HEART RATE: 66 BPM

## 2023-07-12 DIAGNOSIS — M54.41 CHRONIC BILATERAL LOW BACK PAIN WITH BILATERAL SCIATICA: ICD-10-CM

## 2023-07-12 DIAGNOSIS — G89.29 CHRONIC BILATERAL LOW BACK PAIN WITH BILATERAL SCIATICA: ICD-10-CM

## 2023-07-12 DIAGNOSIS — M54.2 CERVICALGIA: Primary | ICD-10-CM

## 2023-07-12 DIAGNOSIS — M46.1 BILATERAL SACROILIITIS: ICD-10-CM

## 2023-07-12 DIAGNOSIS — M54.42 CHRONIC BILATERAL LOW BACK PAIN WITH BILATERAL SCIATICA: ICD-10-CM

## 2023-07-12 PROCEDURE — 99213 OFFICE O/P EST LOW 20 MIN: CPT | Mod: S$PBB,,, | Performed by: PAIN MEDICINE

## 2023-07-12 PROCEDURE — 99213 OFFICE O/P EST LOW 20 MIN: CPT | Mod: PBBFAC | Performed by: PAIN MEDICINE

## 2023-07-12 PROCEDURE — 99213 PR OFFICE/OUTPT VISIT, EST, LEVL III, 20-29 MIN: ICD-10-PCS | Mod: S$PBB,,, | Performed by: PAIN MEDICINE

## 2023-07-12 NOTE — PROGRESS NOTES
She Disclaimer: This note has been generated using voice-recognition software. There may be typographical errors that have been missed during proof-reading        Patient ID: Shelby Salcedo is a 72 y.o. female.      Chief Complaint: Back Pain (upper), Neck Pain, Shoulder Pain (bilateral), and Leg Pain (right)      72-year-old female returns for re-evaluation of chronic lower back, cervical and thoracic pain.  She has  a long history of scoliosis and chronic pain.  X-rays revealed multilevel degenerative changes.  She was unable to start physical therapy due to lack of a referral from the [facility].  She does not desire medication management.  MRI of the cervical thoracic and lumbar spine were recommended if her pain remains intractable after completion of physical therapy.  She denies any changes since the initial office visit.  She reports cervical radicular symptoms to the bilateral upper extremities and lumbar radicular symptoms to the bilateral lower extremity and calves.  She denies paresthesia, weakness , bowel or bladder involvement.  She returns for re-evaluation.          Pain Assessment  Pain Assessment: 0-10  Pain Score:   8  Pain Location: Back (upper back pain, neck pain/ bilateral shoulder pain/ right leg pain)  Pain Descriptors: Aching  Pain Frequency: Constant/continuous  Pain Onset: Awakened from sleep  Clinical Progression: Gradually worsening  Aggravating Factors: Standing  Pain Intervention(s): Medication (See eMAR), Home medication, Heat applied      A's of Opioid Risk Assessment  Activity:Patient has difficulty performing ADL.   Analgesia:  None  Adverse Effects: Patient denies constipation or sedation.  Aberrant Behavior:  reviewed with no aberrant drug seeking/taking behavior.      Patient denies any suicidal or homicidal ideations    Physical Therapy/Home Exercise:  Awaiting to start physical therapy after obtaining referral from the [facility]      X-Ray Thoracic Spine AP  Lateral  Narrative: EXAMINATION:  XR THORACIC SPINE AP LATERAL    CLINICAL HISTORY:  Lumbago with sciatica, left side    TECHNIQUE:  AP and lateral views of the thoracic spine were performed.    COMPARISON:  None    FINDINGS:  Mild degenerative endplate changes are seen throughout the thoracic spine.  The vertebral body heights and alignment are maintained.  Impression: Multilevel degenerative changes.    Electronically signed by: Dakota Matias  Date:    06/12/2023  Time:    14:12  X-Ray Cervical Spine AP Lat with Flexion  Extension  Narrative: EXAMINATION:  XR CERVICAL SPINE AP LAT WITH FLEX EXTEN    CLINICAL HISTORY:  Cervicalgia    TECHNIQUE:  XR CERVICAL SPINE AP LAT WITH FLEX EXTEN    COMPARISON:  None    FINDINGS:  No acute fracture or dislocation.  No dynamic instability.  Mild-to-moderate multilevel degenerative disc disease.  Grade 1 retrolisthesis C4 on C5 and C5 on C6, probably degenerative.    Mild multilevel endplate change and facet change.  No prevertebral soft tissue swelling.  Impression: As above    Electronically signed by: Ricky Streeter  Date:    06/12/2023  Time:    14:12  X-Ray Sacroiliac Joints Complete  Narrative: EXAMINATION:  XR LUMBAR SPINE 5 VIEW WITH FLEX AND EXT; XR SACROILIAC JOINTS COMPLETE    CLINICAL HISTORY:  Lumbago with sciatica, left side; Sacroiliitis, not elsewhere classified    COMPARISON:  None.    TECHNIQUE:  XR LUMBAR SPINE 5 VIEW WITH FLEX AND EXT; XR SACROILIAC JOINTS three views    FINDINGS:  No fracture is seen. Vertebral body heights are within normal limits.  There is spondylolisthesis at the L3-4 less than 25% vertebral body width.  No abnormal subluxation with flexion or extension.    There is loss of disc space and degenerative change of moderate at L4-5 and mild at the remaining levels.  Large amount of facet joint degenerative changes present.    Sacroiliac joints appear within normal limits.  Impression: Multilevel degenerative changes as described  above.    Electronically signed by: Johnny Daley  Date:    06/12/2023  Time:    14:11  X-Ray Lumbar Complete Including Flex And Ext  Narrative: EXAMINATION:  XR LUMBAR SPINE 5 VIEW WITH FLEX AND EXT; XR SACROILIAC JOINTS COMPLETE    CLINICAL HISTORY:  Lumbago with sciatica, left side; Sacroiliitis, not elsewhere classified    COMPARISON:  None.    TECHNIQUE:  XR LUMBAR SPINE 5 VIEW WITH FLEX AND EXT; XR SACROILIAC JOINTS three views    FINDINGS:  No fracture is seen. Vertebral body heights are within normal limits.  There is spondylolisthesis at the L3-4 less than 25% vertebral body width.  No abnormal subluxation with flexion or extension.    There is loss of disc space and degenerative change of moderate at L4-5 and mild at the remaining levels.  Large amount of facet joint degenerative changes present.    Sacroiliac joints appear within normal limits.  Impression: Multilevel degenerative changes as described above.    Electronically signed by: Johnny Daley  Date:    06/12/2023  Time:    14:11      Review of Systems   Constitutional: Negative.    HENT: Negative.     Eyes: Negative.    Respiratory: Negative.     Cardiovascular: Negative.    Gastrointestinal: Negative.    Endocrine: Negative.    Genitourinary: Negative.    Musculoskeletal:  Positive for arthralgias, back pain, gait problem, neck pain and neck stiffness.   Integumentary:  Negative.   Hematological: Negative.    Psychiatric/Behavioral: Negative.             History reviewed. No pertinent past medical history.  Past Surgical History:   Procedure Laterality Date    BUNIONECTOMY Bilateral     HYSTERECTOMY      OOPHORECTOMY      ROTATOR CUFF REPAIR Right      Social History     Socioeconomic History    Marital status:    Tobacco Use    Smoking status: Never    Smokeless tobacco: Never   Substance and Sexual Activity    Alcohol use: Never    Drug use: Never     No family history on file.  Review of patient's allergies indicates:  No Known  Allergies  currently has no medications in their medication list.      Objective:  Vitals:    07/12/23 1447   BP: 119/83   Pulse: 66   Resp: 18        Physical Exam  Musculoskeletal:      Cervical back: Spasms and tenderness present. Decreased range of motion.      Thoracic back: Scoliosis present.      Lumbar back: Decreased range of motion. Scoliosis present.      Comments: Cervical and lumbar facet tenderness to palpation         Assessment:      1. Cervicalgia    2. Bilateral sacroiliitis    3. Chronic bilateral low back pain with bilateral sciatica          Plan:  1. reviewed  2. X-rays reviewed and discussed with patient   3. Start physical therapy as previously requested  4. Consider MRI of the cervical thoracic and lumbar spine after completion of physical therapy and if indicated  5. Patient does not desire medication management  6. Return in 6 weeks for re-evaluation     report:  Reviewed and consistent with medication use as prescribed.      The total time spent for evaluation and management on 07/12/2023 including reviewing separately obtained history, performing a medically appropriate exam and evaluation, documenting clinical information in the health record, independently interpreting results and communicating them to the patient/family/caregiver, and ordering medications/tests/procedures was between 15-29 minutes.    The above plan and management options were discussed at length with patient. Patient is in agreement with the above and verbalized understanding. It will be communicated with the referring physician via electronic record, fax, or mail.

## 2024-04-16 ENCOUNTER — HOSPITAL ENCOUNTER (OUTPATIENT)
Dept: RADIOLOGY | Facility: HOSPITAL | Age: 74
Discharge: HOME | End: 2024-04-16
Attending: NURSE PRACTITIONER
Payer: MEDICARE

## 2024-04-16 VITALS — HEIGHT: 64 IN | WEIGHT: 177 LBS | BODY MASS INDEX: 30.22 KG/M2

## 2024-04-16 DIAGNOSIS — Z12.31 OTHER SCREENING MAMMOGRAM: ICD-10-CM

## 2024-04-16 DIAGNOSIS — N95.1 MENOPAUSAL AND FEMALE CLIMACTERIC STATES: ICD-10-CM

## 2024-04-16 PROCEDURE — 77067 SCR MAMMO BI INCL CAD: CPT | Mod: TC

## 2024-04-16 PROCEDURE — 77080 DXA BONE DENSITY AXIAL: CPT | Mod: 26,,, | Performed by: RADIOLOGY

## 2024-04-16 PROCEDURE — 77080 DXA BONE DENSITY AXIAL: CPT | Mod: TC

## 2024-06-27 DIAGNOSIS — M25.561 RIGHT KNEE PAIN: Primary | ICD-10-CM

## 2024-06-27 DIAGNOSIS — M25.562 LEFT KNEE PAIN: ICD-10-CM

## 2024-07-15 DIAGNOSIS — M25.562 ACUTE PAIN OF BOTH KNEES: Primary | ICD-10-CM

## 2024-07-15 DIAGNOSIS — M25.561 ACUTE PAIN OF BOTH KNEES: Primary | ICD-10-CM

## 2024-07-16 ENCOUNTER — HOSPITAL ENCOUNTER (OUTPATIENT)
Dept: RADIOLOGY | Facility: HOSPITAL | Age: 74
Discharge: HOME | End: 2024-07-16
Payer: MEDICARE

## 2024-07-16 ENCOUNTER — OFFICE VISIT (OUTPATIENT)
Dept: ORTHOPEDICS | Facility: CLINIC | Age: 74
End: 2024-07-16
Payer: MEDICARE

## 2024-07-16 DIAGNOSIS — M25.561 CHRONIC PAIN OF RIGHT KNEE: ICD-10-CM

## 2024-07-16 DIAGNOSIS — M17.0 PRIMARY OSTEOARTHRITIS OF KNEES, BILATERAL: Primary | ICD-10-CM

## 2024-07-16 DIAGNOSIS — G89.29 CHRONIC PAIN OF RIGHT KNEE: ICD-10-CM

## 2024-07-16 DIAGNOSIS — M25.561 ACUTE PAIN OF BOTH KNEES: ICD-10-CM

## 2024-07-16 DIAGNOSIS — M25.562 ACUTE PAIN OF BOTH KNEES: ICD-10-CM

## 2024-07-16 DIAGNOSIS — M25.562 CHRONIC PAIN OF LEFT KNEE: ICD-10-CM

## 2024-07-16 DIAGNOSIS — G89.29 CHRONIC PAIN OF LEFT KNEE: ICD-10-CM

## 2024-07-16 PROCEDURE — 99203 OFFICE O/P NEW LOW 30 MIN: CPT | Mod: S$PBB,25,,

## 2024-07-16 PROCEDURE — 73562 X-RAY EXAM OF KNEE 3: CPT | Mod: TC,50

## 2024-07-16 PROCEDURE — 20610 DRAIN/INJ JOINT/BURSA W/O US: CPT | Mod: 50,PBBFAC

## 2024-07-16 PROCEDURE — 99999 PR PBB SHADOW E&M-EST. PATIENT-LVL III: CPT | Mod: PBBFAC,,,

## 2024-07-16 PROCEDURE — 73562 X-RAY EXAM OF KNEE 3: CPT | Mod: 26,50,, | Performed by: RADIOLOGY

## 2024-07-16 PROCEDURE — 99999PBSHW PR PBB SHADOW TECHNICAL ONLY FILED TO HB: Mod: PBBFAC,,,

## 2024-07-16 PROCEDURE — 99213 OFFICE O/P EST LOW 20 MIN: CPT | Mod: PBBFAC,25

## 2024-07-16 RX ADMIN — Medication 16 MG: at 11:07

## 2024-07-16 RX ADMIN — BUPIVACAINE HYDROCHLORIDE 1 ML: 2.5 INJECTION, SOLUTION EPIDURAL; INFILTRATION; INTRACAUDAL at 11:07

## 2024-07-16 NOTE — PROCEDURES
Large Joint Aspiration/Injection: bilateral knee    Date/Time: 7/16/2024 11:00 AM    Performed by: Melissa Hoffman PA  Authorized by: Melissa Hoffman PA    Consent Done?:  Yes (Verbal)  Indications:  Arthritis and pain    Details:  Needle Size:  21 G  Approach:  Anterolateral  Location:  Knee  Laterality:  Bilateral  Site:  Bilateral knee  Medications (Right):  1 mL BUPivacaine (PF) 0.25% (2.5 mg/ml) 0.25 % (2.5 mg/mL); 16 mg hyaluronate 16 mg/2 mL  Medications (Left):  1 mL BUPivacaine (PF) 0.25% (2.5 mg/ml) 0.25 % (2.5 mg/mL); 16 mg hyaluronate 16 mg/2 mL  Patient tolerance:  Patient tolerated the procedure well with no immediate complications
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
## 2024-07-17 RX ORDER — BUPIVACAINE HYDROCHLORIDE 2.5 MG/ML
1 INJECTION, SOLUTION EPIDURAL; INFILTRATION; INTRACAUDAL
Status: DISCONTINUED | OUTPATIENT
Start: 2024-07-16 | End: 2024-07-17 | Stop reason: HOSPADM

## 2024-07-23 ENCOUNTER — OFFICE VISIT (OUTPATIENT)
Dept: ORTHOPEDICS | Facility: CLINIC | Age: 74
End: 2024-07-23
Payer: MEDICARE

## 2024-07-23 VITALS — WEIGHT: 177 LBS | HEIGHT: 64 IN | BODY MASS INDEX: 30.22 KG/M2

## 2024-07-23 DIAGNOSIS — M17.0 PRIMARY OSTEOARTHRITIS OF KNEES, BILATERAL: Primary | ICD-10-CM

## 2024-07-23 PROCEDURE — 99499 UNLISTED E&M SERVICE: CPT | Mod: S$PBB,,,

## 2024-07-23 PROCEDURE — 20610 DRAIN/INJ JOINT/BURSA W/O US: CPT | Mod: 50,PBBFAC

## 2024-07-23 PROCEDURE — 99999 PR PBB SHADOW E&M-EST. PATIENT-LVL III: CPT | Mod: PBBFAC,,,

## 2024-07-23 PROCEDURE — 99999PBSHW PR PBB SHADOW TECHNICAL ONLY FILED TO HB: Mod: PBBFAC,,,

## 2024-07-23 PROCEDURE — 99213 OFFICE O/P EST LOW 20 MIN: CPT | Mod: PBBFAC,25

## 2024-07-23 RX ORDER — BUPIVACAINE HYDROCHLORIDE 2.5 MG/ML
1 INJECTION, SOLUTION EPIDURAL; INFILTRATION; INTRACAUDAL
Status: DISCONTINUED | OUTPATIENT
Start: 2024-07-23 | End: 2024-07-23 | Stop reason: HOSPADM

## 2024-07-23 RX ADMIN — Medication 16 MG: at 01:07

## 2024-07-23 RX ADMIN — BUPIVACAINE HYDROCHLORIDE 1 ML: 2.5 INJECTION, SOLUTION EPIDURAL; INFILTRATION; INTRACAUDAL at 01:07

## 2024-07-23 NOTE — PROCEDURES
Large Joint Aspiration/Injection: bilateral knee    Date/Time: 7/23/2024 1:00 PM    Performed by: Melissa Hoffman PA  Authorized by: Melissa Hoffman PA    Consent Done?:  Yes (Verbal)  Indications:  Arthritis and pain    Details:  Needle Size:  22 G  Approach:  Anterolateral  Location:  Knee  Laterality:  Bilateral  Site:  Bilateral knee  Medications (Right):  1 mL BUPivacaine (PF) 0.25% (2.5 mg/ml) 0.25 % (2.5 mg/mL); 16 mg hyaluronate 16 mg/2 mL  Medications (Left):  1 mL BUPivacaine (PF) 0.25% (2.5 mg/ml) 0.25 % (2.5 mg/mL); 16 mg hyaluronate 16 mg/2 mL  Patient tolerance:  Patient tolerated the procedure well with no immediate complications

## 2024-07-23 NOTE — PROGRESS NOTES
CC:   Chief Complaint   Patient presents with    Right Knee - Pain    Left Knee - Pain          Shelby Salcedo is a 73 y.o. female seen today for follow up of Pain of the Right Knee and Pain of the Left Knee  Patient presents for part 2 of Synvisc injection.  No new complaints today.        PAST MEDICAL HISTORY: History reviewed. No pertinent past medical history.     PAST SURGICAL HISTORY:   Past Surgical History:   Procedure Laterality Date    BUNIONECTOMY Bilateral     HYSTERECTOMY      OOPHORECTOMY      ROTATOR CUFF REPAIR Right         ALLERGIES: Review of patient's allergies indicates:  No Known Allergies     MEDICATIONS :  No current outpatient medications on file.     SOCIAL HISTORY:   Social History     Socioeconomic History    Marital status:    Tobacco Use    Smoking status: Never    Smokeless tobacco: Never   Substance and Sexual Activity    Alcohol use: Never    Drug use: Never        FAMILY HISTORY: No family history on file.       PHYSICAL EXAM:      There were no vitals filed for this visit.  Body mass index is 30.38 kg/m².    GENERAL: Well-developed, well-nourished female . The patient is alert, oriented and cooperative.    EXTREMITIES:       RADIOGRAPHIC FINDINGS:   X-Ray Knee AP LAT with [place] Bilat    Result Date: 7/16/2024  EXAMINATION: XR KNEE AP LAT WITH SUNRISE BILAT CLINICAL HISTORY: Pain in right knee COMPARISON: None available TECHNIQUE: XR KNEE AP LAT WITH SUNRISE BILAT FINDINGS: No evidence of fracture seen.  The alignment of the joints appears normal.  Severe right knee lateral compartment and moderate remaining compartment degenerative change is present.  No soft tissue abnormality is seen.     Knee osteoarthrosis as described above. Electronically signed by: Johnny Daley Date:    07/16/2024 Time:    11:59       Patient Active Problem List    Diagnosis Date Noted    Decreased range of motion of trunk and back 03/30/2023    Shoulder pain 05/18/2021      IMPRESSION AND PLAN:  Primary osteoarthritis bilateral knees.  Part 2 of Synvisc injection given today, see procedural note for details.  We will see back next week for part 3.        No follow-ups on file.       Melissa Hoffman PA-C      (Subject to voice recognition error, transcription service not allowed)

## 2024-07-30 ENCOUNTER — OFFICE VISIT (OUTPATIENT)
Dept: ORTHOPEDICS | Facility: CLINIC | Age: 74
End: 2024-07-30
Payer: MEDICARE

## 2024-07-30 DIAGNOSIS — M17.0 PRIMARY OSTEOARTHRITIS OF KNEES, BILATERAL: Primary | ICD-10-CM

## 2024-07-30 PROCEDURE — 99211 OFF/OP EST MAY X REQ PHY/QHP: CPT | Mod: PBBFAC,25

## 2024-07-30 PROCEDURE — 99999PBSHW PR PBB SHADOW TECHNICAL ONLY FILED TO HB: Mod: PBBFAC,,,

## 2024-07-30 PROCEDURE — 99999 PR PBB SHADOW E&M-EST. PATIENT-LVL I: CPT | Mod: PBBFAC,,,

## 2024-07-30 PROCEDURE — 20610 DRAIN/INJ JOINT/BURSA W/O US: CPT | Mod: 50,PBBFAC

## 2024-07-30 RX ORDER — BUPIVACAINE HYDROCHLORIDE 2.5 MG/ML
1 INJECTION, SOLUTION EPIDURAL; INFILTRATION; INTRACAUDAL
Status: DISCONTINUED | OUTPATIENT
Start: 2024-07-30 | End: 2024-07-30 | Stop reason: HOSPADM

## 2024-07-30 RX ADMIN — Medication 16 MG: at 12:07

## 2024-07-30 RX ADMIN — BUPIVACAINE HYDROCHLORIDE 1 ML: 2.5 INJECTION, SOLUTION EPIDURAL; INFILTRATION; INTRACAUDAL at 12:07

## 2024-07-30 NOTE — PROGRESS NOTES
CC: No chief complaint on file.         Shelby Salcedo is a 73 y.o. female seen today for follow up of No chief complaint on file.  Patient presents for part 3 of bilateral Synvisc injections.  She reports continued improvement in her pain.  She still reports stiffness in her knees.  No other complaints today.        PAST MEDICAL HISTORY: No past medical history on file.     PAST SURGICAL HISTORY:   Past Surgical History:   Procedure Laterality Date    BUNIONECTOMY Bilateral     HYSTERECTOMY      OOPHORECTOMY      ROTATOR CUFF REPAIR Right         ALLERGIES: Review of patient's allergies indicates:  No Known Allergies     MEDICATIONS :  No current outpatient medications on file.     SOCIAL HISTORY:   Social History     Socioeconomic History    Marital status:    Tobacco Use    Smoking status: Never    Smokeless tobacco: Never   Substance and Sexual Activity    Alcohol use: Never    Drug use: Never        FAMILY HISTORY: No family history on file.       PHYSICAL EXAM:      There were no vitals filed for this visit.  There is no height or weight on file to calculate BMI.    GENERAL: Well-developed, well-nourished female . The patient is alert, oriented and cooperative.    EXTREMITIES:       RADIOGRAPHIC FINDINGS:   X-Ray Knee AP LAT with [place] Bilat    Result Date: 7/16/2024  EXAMINATION: XR KNEE AP LAT WITH SUNRISE BILAT CLINICAL HISTORY: Pain in right knee COMPARISON: None available TECHNIQUE: XR KNEE AP LAT WITH SUNRISE BILAT FINDINGS: No evidence of fracture seen.  The alignment of the joints appears normal.  Severe right knee lateral compartment and moderate remaining compartment degenerative change is present.  No soft tissue abnormality is seen.     Knee osteoarthrosis as described above. Electronically signed by: Johnny Daley Date:    07/16/2024 Time:    11:59       Patient Active Problem List    Diagnosis Date Noted    Decreased range of motion of trunk and back 03/30/2023     Shoulder pain 05/18/2021     IMPRESSION AND PLAN:   Bilateral knee Synvisc injections today, see procedural note for details.  Discussed with patient that these can be repeated every 6 months as needed.  Follow up p.r.n..        No follow-ups on file.       Melissa Hoffman PA-C      (Subject to voice recognition error, transcription service not allowed)

## 2024-07-30 NOTE — PROCEDURES
Large Joint Aspiration/Injection: bilateral knee    Date/Time: 7/30/2024 12:40 PM    Performed by: Melissa Hoffman PA  Authorized by: Melissa Hoffman PA    Consent Done?:  Yes (Verbal)  Indications:  Arthritis and pain    Details:  Needle Size:  21 G  Approach:  Anterolateral  Location:  Knee  Laterality:  Bilateral  Site:  Bilateral knee  Medications (Right):  1 mL BUPivacaine (PF) 0.25% (2.5 mg/ml) 0.25 % (2.5 mg/mL); 16 mg hyaluronate 16 mg/2 mL  Medications (Left):  1 mL BUPivacaine (PF) 0.25% (2.5 mg/ml) 0.25 % (2.5 mg/mL); 16 mg hyaluronate 16 mg/2 mL  Patient tolerance:  Patient tolerated the procedure well with no immediate complications

## 2024-10-03 DIAGNOSIS — I87.2 VENOUS INSUFFICIENCY (CHRONIC) (PERIPHERAL): Primary | ICD-10-CM

## 2024-10-29 ENCOUNTER — OFFICE VISIT (OUTPATIENT)
Dept: VASCULAR SURGERY | Facility: CLINIC | Age: 74
End: 2024-10-29
Payer: MEDICARE

## 2024-10-29 VITALS
RESPIRATION RATE: 16 BRPM | HEIGHT: 64 IN | WEIGHT: 176.63 LBS | DIASTOLIC BLOOD PRESSURE: 82 MMHG | HEART RATE: 67 BPM | BODY MASS INDEX: 30.16 KG/M2 | SYSTOLIC BLOOD PRESSURE: 161 MMHG

## 2024-10-29 DIAGNOSIS — I87.2 VENOUS INSUFFICIENCY (CHRONIC) (PERIPHERAL): ICD-10-CM

## 2024-10-29 DIAGNOSIS — L81.9 HYPERPIGMENTATION OF SKIN: ICD-10-CM

## 2024-10-29 DIAGNOSIS — M79.605 LEG PAIN, BILATERAL: Primary | ICD-10-CM

## 2024-10-29 DIAGNOSIS — R60.0 EDEMA, LOWER EXTREMITY: ICD-10-CM

## 2024-10-29 DIAGNOSIS — M79.604 LEG PAIN, BILATERAL: Primary | ICD-10-CM

## 2024-10-29 PROCEDURE — 99215 OFFICE O/P EST HI 40 MIN: CPT | Mod: PBBFAC | Performed by: FAMILY MEDICINE

## 2024-10-29 PROCEDURE — 99999 PR PBB SHADOW E&M-EST. PATIENT-LVL V: CPT | Mod: PBBFAC,,, | Performed by: FAMILY MEDICINE

## 2024-10-29 PROCEDURE — 99203 OFFICE O/P NEW LOW 30 MIN: CPT | Mod: S$PBB,,, | Performed by: FAMILY MEDICINE

## 2024-10-29 RX ORDER — ASCORBIC ACID 250 MG
TABLET,CHEWABLE ORAL
COMMUNITY

## 2024-10-29 RX ORDER — LATANOPROST 50 UG/ML
SOLUTION/ DROPS OPHTHALMIC
COMMUNITY

## 2024-10-29 RX ORDER — ERGOCALCIFEROL 1.25 MG/1
CAPSULE ORAL
COMMUNITY
Start: 2024-06-14

## 2024-10-29 RX ORDER — PSYLLIUM HUSK 0.4 G
CAPSULE ORAL
COMMUNITY

## 2024-10-29 RX ORDER — VIT C/E/ZN/COPPR/LUTEIN/ZEAXAN 250MG-90MG
CAPSULE ORAL
COMMUNITY

## 2024-11-26 ENCOUNTER — OFFICE VISIT (OUTPATIENT)
Dept: VASCULAR SURGERY | Facility: CLINIC | Age: 74
End: 2024-11-26
Attending: FAMILY MEDICINE
Payer: MEDICARE

## 2024-11-26 ENCOUNTER — HOSPITAL ENCOUNTER (OUTPATIENT)
Dept: RADIOLOGY | Facility: HOSPITAL | Age: 74
Discharge: HOME | End: 2024-11-26
Attending: FAMILY MEDICINE
Payer: MEDICARE

## 2024-11-26 VITALS
SYSTOLIC BLOOD PRESSURE: 154 MMHG | HEIGHT: 64 IN | RESPIRATION RATE: 16 BRPM | WEIGHT: 176 LBS | DIASTOLIC BLOOD PRESSURE: 80 MMHG | HEART RATE: 68 BPM | BODY MASS INDEX: 30.05 KG/M2

## 2024-11-26 DIAGNOSIS — R60.0 EDEMA, LOWER EXTREMITY: ICD-10-CM

## 2024-11-26 DIAGNOSIS — I87.2 VENOUS INSUFFICIENCY (CHRONIC) (PERIPHERAL): Primary | ICD-10-CM

## 2024-11-26 DIAGNOSIS — M79.605 LEG PAIN, BILATERAL: ICD-10-CM

## 2024-11-26 DIAGNOSIS — M79.604 LEG PAIN, BILATERAL: ICD-10-CM

## 2024-11-26 DIAGNOSIS — L81.9 HYPERPIGMENTATION OF SKIN: ICD-10-CM

## 2024-11-26 PROCEDURE — 99999 PR PBB SHADOW E&M-EST. PATIENT-LVL IV: CPT | Mod: PBBFAC,,, | Performed by: FAMILY MEDICINE

## 2024-11-26 PROCEDURE — 99214 OFFICE O/P EST MOD 30 MIN: CPT | Mod: S$PBB,,, | Performed by: FAMILY MEDICINE

## 2024-11-26 PROCEDURE — 93970 EXTREMITY STUDY: CPT | Mod: TC

## 2024-11-26 PROCEDURE — 99214 OFFICE O/P EST MOD 30 MIN: CPT | Mod: PBBFAC,25 | Performed by: FAMILY MEDICINE

## 2024-11-26 NOTE — PROGRESS NOTES
VEIN CENTER CLINIC NOTE    Patient ID: Shelby Salcedo is a 74 y.o. female.    I. HISTORY     Chief Complaint:   Chief Complaint   Patient presents with    Results     EXAM ROOM 2   JEAN COMP        HPI: Shelby Salcedo is a 74 y.o. female who presents today for follow-up and results to a bilateral complete venous reflux study performed today 11/26/2024 and the results were discussed with the patient.  This study shows no evidence of DVT bilaterally.  The study also shows proximal left great saphenous vein and partial segments of the right great saphenous vein are not visualized, presumably from previous vein stripping or ablation.  The study also shows dilation reflux in the mid portion of the right great saphenous vein, right small saphenous vein, and left distal great saphenous vein.  Refluxing varicosities also noted right medial leg and right posterior calf.    The patient was initially seen on 10/29/2024 by referral from [facility] for evaluation of lower extremity edema, pain, hyperpigmentation and history of chronic venous insufficiency.  Symptoms are progressive/worsening and began greater than 30 years ago.  Location is bilateral lower extremities below the knees. Symptoms are worse at the end of the day.  History of venous interventions includes bilateral vein strippings versus micros about 25 years ago.  Positive, mother, family history of venous disease.  Also admits to history of varicose vein rupture, but that was before her procedures.  Denies history of DVT or cellulitis.      Venous Disease Medical Necessity Documentation Initial Visit Date:  10/29/2024 Return Check Date:    Have you ever had a rupture or bleed from a varicose vein in your leg(s)?              [x] Yes  [] No   [] Yes   [] No   Have you ever been diagnosed with phlebitis, cellulitis, or inflammation in the area of the varicose veins of  your leg(s)?  [] Yes  [x] No    [] Yes   [] No   Do you have darkened or inflamed skin on  your legs?   [x] Yes   [] No   [] Yes   [] No   Do you have leg swelling?     [x] Yes   [] No   [] Yes   [] No   Do you have leg pain?   [x] Yes   [] No   [] Yes   [] No   If yes, describe the type of pain?    [x]   Stabbing [x]  Radiating [x]  Aching   [x]  Tightness [x]  Throbbing               [x]  Burning [x]  Cramping              Do you have leg discomfort?   [x] Yes   [] No   [] Yes   [] No   If yes, describe the type of discomfort?    [x]  Heaviness [x]  Fullness   []  Restlessness [x] Tired/Fatigued [x] Itching              Have you ever worn compression hose?   [x] Yes   [] No   [] Yes   [] No   If yes, how long?  1.5 YEARS         Do you elevate your legs while sitting?   [x] Yes   [] No   [] Yes   [] No   Does venous disease (varicose veins, ulcers, skin changes, leg pain/swelling) interfere with your daily life?  If yes, check activities you are limited or unable to do.    []  Shower  [x]   Walk  []  Exercise  [] Play with children/grandchildren  [x]  Shop [] Work [x] Stand for any period of time [x] Sleep                               [x] Sitting for an extended period of time.           [x] Yes   [] No   [] Yes   [] No   Do you exercise/have you tried to exercise (i.e.  Walk our participate in a regular exercise routine)?  [x] Yes  [] No   [] Yes   [] No   BMI   30.31           History reviewed. No pertinent past medical history.     Past Surgical History:   Procedure Laterality Date    BUNIONECTOMY Bilateral     HYSTERECTOMY      OOPHORECTOMY      ROTATOR CUFF REPAIR Right        Social History     Tobacco Use   Smoking Status Never   Smokeless Tobacco Never         Current Outpatient Medications:     ascorbic acid, vitamin C, (VITAMIN C) 250 mg Chew, 500mg daily, Disp: , Rfl:     calcium carbonate (CALCIUM 600) 600 mg calcium (1,500 mg) Tab, 2 tablets daily, Disp: , Rfl:     cholecalciferol, vitamin D3, (VITAMIN D3) 25 mcg (1,000 unit) capsule, Take by oral route., Disp: , Rfl:     ergocalciferol  (ERGOCALCIFEROL) 50,000 unit Cap, , Disp: , Rfl:     iron,carb/vit C/vit B12/folic (IRON 100 PLUS ORAL), , Disp: , Rfl:     latanoprost 0.005 % ophthalmic solution, Place into both eyes., Disp: , Rfl:     POTASSIUM-CALCIUM-MAGNESIUM ORAL, , Disp: , Rfl:     Review of Systems   Constitutional:  Negative for activity change, chills, diaphoresis, fatigue and fever.   Respiratory:  Negative for cough and shortness of breath.    Cardiovascular:  Positive for leg swelling. Negative for chest pain and claudication.        Hyperpigmentation LE   Gastrointestinal:  Negative for nausea and vomiting.   Musculoskeletal:  Positive for leg pain. Negative for joint swelling.   Integumentary:  Negative for rash and wound.   Neurological:  Negative for weakness and numbness.      II. PHYSICAL EXAM     Physical Exam  Constitutional:       General: She is awake. She is not in acute distress.     Appearance: Normal appearance. She is not ill-appearing or toxic-appearing.   HENT:      Head: Normocephalic and atraumatic.   Eyes:      Extraocular Movements: Extraocular movements intact.      Conjunctiva/sclera: Conjunctivae normal.      Pupils: Pupils are equal, round, and reactive to light.   Neck:      Vascular: No carotid bruit or JVD.   Cardiovascular:      Rate and Rhythm: Normal rate and regular rhythm.      Pulses:           Dorsalis pedis pulses are detected w/ Doppler on the right side and detected w/ Doppler on the left side.        Posterior tibial pulses are detected w/ Doppler on the right side and detected w/ Doppler on the left side.      Heart sounds: No murmur heard.  Pulmonary:      Effort: Pulmonary effort is normal. No respiratory distress.      Breath sounds: No stridor. No wheezing, rhonchi or rales.   Musculoskeletal:         General: No swelling, tenderness or deformity.      Right lower le+ Edema present.      Left lower le+ Edema present.      Comments: No active cellulitis or open ulceration bilaterally.    Feet:      Comments: Triphasic hand-held dopplerable pulses of the bilateral dorsalis pedis and posterior tibial arteries.  Skin:     General: Skin is warm.      Capillary Refill: Capillary refill takes less than 2 seconds.      Coloration: Skin is not ashen.      Findings: No bruising, erythema, lesion, rash or wound.   Neurological:      Mental Status: She is alert and oriented to person, place, and time.      Motor: No weakness.   Psychiatric:         Speech: Speech normal.         Behavior: Behavior normal. Behavior is cooperative.       Reticular/Spider veins noted:  RLE: anterior calf, medial calf, posterior calf, and lateral calf  LLE: anterior calf, medial calf, posterior calf, and lateral calf    Varicose veins noted:  RLE: none  LLE:  none    CEAP Classification  Clinical Signs: Class 4 - Skin changes ascribed to venous disease  Etiologic Classification: Primary  Anatomic distribution: Superficial  Pathophysiologic dysfunction: Reflux                       Venous Clinical Severity Score  Pain:2=Daily, moderate activity limitation, occasional analgesics  Varicose Veins: 2=Multiple. GS varicose veins confined to calf or thigh  Venous Edema: 2=Afternoon edema, above ankle  Pigmentation: 1=Diffuse, but limited in area and old (brown)  Inflammation: 0=None  Induration: 0=None  Number of Active Ulcers: 0=0  Active Ulceration, Duration: 0=None  Active Ulcer Size: 0=None  Compressive Therapy: 0=Not used or not compliant  Total Score: 7       III. ASSESSMENT & PLAN (MEDICAL DECISION MAKING)     1. Venous insufficiency (chronic) (peripheral)    2. Leg pain, bilateral    3. Edema, lower extremity    4. Hyperpigmentation of skin        Assessment/Diagnosis and Plan:  Ultrasound of lower extremities reveals positive evidence of venous insufficiency in the right mid great saphenous vein, right small saphenous vein and distal left great saphenous vein with associated refluxing varicosities of the right lower extremity.   Plan for conservative medical treatment at this time. The patient may benefit from endovenous ablation in the future.     - Start compression with 15-20 mmHg Rx stockings  - Therapeutic leg elevation  - Calf pumping exercises  - RTC 3 months for further evaluation        Baltazar Ford DO

## 2025-01-22 ENCOUNTER — TELEPHONE (OUTPATIENT)
Dept: VASCULAR SURGERY | Facility: CLINIC | Age: 75
End: 2025-01-22
Payer: MEDICARE

## 2025-03-03 ENCOUNTER — OFFICE VISIT (OUTPATIENT)
Dept: VASCULAR SURGERY | Facility: CLINIC | Age: 75
End: 2025-03-03
Payer: MEDICARE

## 2025-03-03 VITALS — RESPIRATION RATE: 14 BRPM | WEIGHT: 174.19 LBS | BODY MASS INDEX: 29.74 KG/M2 | HEART RATE: 66 BPM | HEIGHT: 64 IN

## 2025-03-03 DIAGNOSIS — I87.2 VENOUS INSUFFICIENCY (CHRONIC) (PERIPHERAL): Primary | ICD-10-CM

## 2025-03-03 DIAGNOSIS — M79.604 LEG PAIN, BILATERAL: ICD-10-CM

## 2025-03-03 DIAGNOSIS — L81.9 HYPERPIGMENTATION OF SKIN: ICD-10-CM

## 2025-03-03 DIAGNOSIS — M79.605 LEG PAIN, BILATERAL: ICD-10-CM

## 2025-03-03 DIAGNOSIS — R60.0 EDEMA, LOWER EXTREMITY: ICD-10-CM

## 2025-03-03 PROCEDURE — 99999 PR PBB SHADOW E&M-EST. PATIENT-LVL IV: CPT | Mod: PBBFAC,,, | Performed by: FAMILY MEDICINE

## 2025-03-03 PROCEDURE — 99214 OFFICE O/P EST MOD 30 MIN: CPT | Mod: S$PBB,,, | Performed by: FAMILY MEDICINE

## 2025-03-03 PROCEDURE — 99214 OFFICE O/P EST MOD 30 MIN: CPT | Mod: PBBFAC | Performed by: FAMILY MEDICINE

## 2025-03-03 RX ORDER — DICLOFENAC SODIUM 10 MG/G
GEL TOPICAL
COMMUNITY
Start: 2024-11-07

## 2025-03-03 NOTE — PROGRESS NOTES
VEIN CENTER CLINIC NOTE    Patient ID: Shelby Salcedo is a 74 y.o. female.    I. HISTORY     Chief Complaint:   Chief Complaint   Patient presents with    Follow-up     3M in comp        HPI: Shelby Salcedo is a 74 y.o. female who presents today for follow-up after undergoing 3 months of conservative therapy with 15-20 millimeter of mercury compression stockings, calf pumping exercises and therapeutic leg elevation.  The patient states that these measures helped to improve her daily symptoms with left leg pain, heaviness tightness.  She denies any life altering symptoms at this time and would like to continue with conservative therapy if possible.    Bilateral complete venous reflux study performed 11/26/2024 shows no evidence of DVT bilaterally.  The study also shows proximal left great saphenous vein and partial segments of the right great saphenous vein are not visualized, presumably from previous vein stripping or ablation.  The study also shows dilation reflux in the mid portion of the right great saphenous vein, right small saphenous vein, and left distal great saphenous vein.  Refluxing varicosities also noted right medial leg and right posterior calf.    The patient was initially seen on 10/29/2024 by referral [facility] for evaluation of lower extremity edema, pain, hyperpigmentation and history of chronic venous insufficiency.  Symptoms are progressive/worsening and began greater than 30 years ago.  Location is bilateral lower extremities below the knees. Symptoms are worse at the end of the day.  History of venous interventions includes bilateral vein strippings versus micros about 25 years ago.  Positive, mother, family history of venous disease.  Also admits to history of varicose vein rupture, but that was before her procedures.  Denies history of DVT or cellulitis.    Venous Disease Medical Necessity Documentation Initial Visit Date:  10/29/2024 Return Check Date:   03/03/2025   Have you  ever had a rupture or bleed from a varicose vein in your leg(s)?              [x] Yes  [] No   [] Yes   [x] No   Have you ever been diagnosed with phlebitis, cellulitis, or inflammation in the area of the varicose veins of  your leg(s)?  [] Yes  [x] No    [] Yes   [x] No   Do you have darkened or inflamed skin on your legs?   [x] Yes   [] No   [x] Yes   [] No   Do you have leg swelling?     [x] Yes   [] No   [] Yes   [x] No   Do you have leg pain?   [x] Yes   [] No   [] Yes   [x] No   If yes, describe the type of pain?    [x]   Stabbing [x]  Radiating [x]  Aching   [x]  Tightness [x]  Throbbing               [x]  Burning [x]  Cramping          Improved    Do you have leg discomfort?   [x] Yes   [] No   [] Yes   [x] No   If yes, describe the type of discomfort?    [x]  Heaviness [x]  Fullness   []  Restlessness [x] Tired/Fatigued [x] Itching              Have you ever worn compression hose?   [x] Yes   [] No   [x] Yes   [] No   If yes, how long?  1.5 YEARS   Almost 2 yrs      Do you elevate your legs while sitting?   [x] Yes   [] No   [x] Yes   [] No   Does venous disease (varicose veins, ulcers, skin changes, leg pain/swelling) interfere with your daily life?  If yes, check activities you are limited or unable to do.    []  Shower  [x]   Walk  []  Exercise  [] Play with children/grandchildren  [x]  Shop [] Work [x] Stand for any period of time [x] Sleep                               [x] Sitting for an extended period of time.           [x] Yes   [] No   [x] Yes   [] No    Improved   Do you exercise/have you tried to exercise (i.e.  Walk our participate in a regular exercise routine)?  [x] Yes  [] No   [x] Yes   [] No   BMI   30.31 29.9          History reviewed. No pertinent past medical history.     Past Surgical History:   Procedure Laterality Date    BUNIONECTOMY Bilateral     HYSTERECTOMY      OOPHORECTOMY      ROTATOR CUFF REPAIR Right        Social History     Tobacco Use   Smoking Status Never   Smokeless  Tobacco Never         Current Outpatient Medications:     ascorbic acid, vitamin C, (VITAMIN C) 250 mg Chew, 500mg daily, Disp: , Rfl:     calcium carbonate (CALCIUM 600) 600 mg calcium (1,500 mg) Tab, 2 tablets daily, Disp: , Rfl:     cholecalciferol, vitamin D3, (VITAMIN D3) 25 mcg (1,000 unit) capsule, Take by oral route., Disp: , Rfl:     diclofenac sodium (VOLTAREN) 1 % Gel, Apply topically., Disp: , Rfl:     ergocalciferol (ERGOCALCIFEROL) 50,000 unit Cap, , Disp: , Rfl:     iron,carb/vit C/vit B12/folic (IRON 100 PLUS ORAL), , Disp: , Rfl:     latanoprost 0.005 % ophthalmic solution, Place into both eyes., Disp: , Rfl:     POTASSIUM-CALCIUM-MAGNESIUM ORAL, , Disp: , Rfl:     Review of Systems   Constitutional:  Negative for activity change, chills, diaphoresis, fatigue and fever.   Respiratory:  Negative for cough and shortness of breath.    Cardiovascular:  Positive for leg swelling. Negative for chest pain and claudication.        Hyperpigmentation LE   Gastrointestinal:  Negative for nausea and vomiting.   Musculoskeletal:  Positive for leg pain. Negative for joint swelling.   Integumentary:  Negative for rash and wound.   Neurological:  Negative for weakness and numbness.      II. PHYSICAL EXAM     Physical Exam  Constitutional:       General: She is awake. She is not in acute distress.     Appearance: Normal appearance. She is not ill-appearing or toxic-appearing.   HENT:      Head: Normocephalic and atraumatic.   Eyes:      Extraocular Movements: Extraocular movements intact.      Conjunctiva/sclera: Conjunctivae normal.      Pupils: Pupils are equal, round, and reactive to light.   Neck:      Vascular: No carotid bruit or JVD.   Cardiovascular:      Rate and Rhythm: Normal rate and regular rhythm.      Pulses:           Dorsalis pedis pulses are detected w/ Doppler on the right side and detected w/ Doppler on the left side.        Posterior tibial pulses are detected w/ Doppler on the right side and  detected w/ Doppler on the left side.      Heart sounds: No murmur heard.  Pulmonary:      Effort: Pulmonary effort is normal. No respiratory distress.      Breath sounds: No stridor. No wheezing, rhonchi or rales.   Musculoskeletal:         General: No swelling, tenderness or deformity.      Right lower le+ Edema present.      Left lower le+ Edema present.      Comments: No active cellulitis or open ulceration bilaterally.   Feet:      Comments: Triphasic hand-held dopplerable pulses of the bilateral dorsalis pedis and posterior tibial arteries.  Skin:     General: Skin is warm.      Capillary Refill: Capillary refill takes less than 2 seconds.      Coloration: Skin is not ashen.      Findings: No bruising, erythema, lesion, rash or wound.   Neurological:      Mental Status: She is alert and oriented to person, place, and time.      Motor: No weakness.   Psychiatric:         Speech: Speech normal.         Behavior: Behavior normal. Behavior is cooperative.       Reticular/Spider veins noted:  RLE: anterior calf, medial calf, posterior calf, and lateral calf  LLE: anterior calf, medial calf, posterior calf, and lateral calf    Varicose veins noted:  RLE: none  LLE:  none    CEAP Classification  Clinical Signs: Class 4 - Skin changes ascribed to venous disease  Etiologic Classification: Primary  Anatomic distribution: Superficial  Pathophysiologic dysfunction: Reflux                       Venous Clinical Severity Score  Pain:2=Daily, moderate activity limitation, occasional analgesics  Varicose Veins: 2=Multiple. GS varicose veins confined to calf or thigh  Venous Edema: 2=Afternoon edema, above ankle  Pigmentation: 1=Diffuse, but limited in area and old (brown)  Inflammation: 0=None  Induration: 0=None  Number of Active Ulcers: 0=0  Active Ulceration, Duration: 0=None  Active Ulcer Size: 0=None  Compressive Therapy: 0=Not used or not compliant  Total Score: 7       III. ASSESSMENT & PLAN (MEDICAL DECISION  MAKING)     1. Venous insufficiency (chronic) (peripheral)    2. Leg pain, bilateral    3. Edema, lower extremity    4. Hyperpigmentation of skin        Assessment/Diagnosis and Plan:  Previous Ultrasound of lower extremities reveals positive evidence of venous insufficiency in the right mid great saphenous vein, right small saphenous vein and distal left great saphenous vein with associated refluxing varicosities of the right lower extremity.  Plan for conservative medical treatment at this time. The patient may benefit from endovenous ablation in the future.     - continue compression with 15-20 mmHg Rx stockings  - Therapeutic leg elevation  - Calf pumping exercises  - RTC 6 months for further evaluation        Baltazar Ford DO

## 2025-04-22 ENCOUNTER — HOSPITAL ENCOUNTER (OUTPATIENT)
Dept: RADIOLOGY | Facility: HOSPITAL | Age: 75
Discharge: HOME | End: 2025-04-22
Attending: NURSE PRACTITIONER
Payer: MEDICARE

## 2025-04-22 VITALS — WEIGHT: 170 LBS | BODY MASS INDEX: 29.02 KG/M2 | HEIGHT: 64 IN

## 2025-04-22 DIAGNOSIS — Z12.31 VISIT FOR SCREENING MAMMOGRAM: ICD-10-CM

## 2025-04-22 PROCEDURE — 77067 SCR MAMMO BI INCL CAD: CPT | Mod: TC

## 2025-04-22 PROCEDURE — 77067 SCR MAMMO BI INCL CAD: CPT | Mod: 26,,, | Performed by: RADIOLOGY

## 2025-04-22 PROCEDURE — 77063 BREAST TOMOSYNTHESIS BI: CPT | Mod: 26,,, | Performed by: RADIOLOGY

## 2025-07-28 ENCOUNTER — OFFICE VISIT (OUTPATIENT)
Dept: VASCULAR SURGERY | Facility: CLINIC | Age: 75
End: 2025-07-28
Payer: MEDICARE

## 2025-07-28 VITALS
DIASTOLIC BLOOD PRESSURE: 76 MMHG | RESPIRATION RATE: 18 BRPM | HEIGHT: 64 IN | BODY MASS INDEX: 29.16 KG/M2 | WEIGHT: 170.81 LBS | HEART RATE: 63 BPM | SYSTOLIC BLOOD PRESSURE: 149 MMHG

## 2025-07-28 DIAGNOSIS — I87.2 VENOUS INSUFFICIENCY (CHRONIC) (PERIPHERAL): Primary | ICD-10-CM

## 2025-07-28 DIAGNOSIS — L81.9 HYPERPIGMENTATION OF SKIN: ICD-10-CM

## 2025-07-28 DIAGNOSIS — M79.605 LEG PAIN, BILATERAL: ICD-10-CM

## 2025-07-28 DIAGNOSIS — M79.604 LEG PAIN, BILATERAL: ICD-10-CM

## 2025-07-28 DIAGNOSIS — I83.813 VARICOSE VEINS OF BILATERAL LOWER EXTREMITIES WITH PAIN: ICD-10-CM

## 2025-07-28 DIAGNOSIS — R60.0 EDEMA, LOWER EXTREMITY: ICD-10-CM

## 2025-07-28 PROCEDURE — 99999 PR PBB SHADOW E&M-EST. PATIENT-LVL IV: CPT | Mod: PBBFAC,,, | Performed by: FAMILY MEDICINE

## 2025-07-28 PROCEDURE — 99214 OFFICE O/P EST MOD 30 MIN: CPT | Mod: PBBFAC | Performed by: FAMILY MEDICINE

## 2025-07-28 RX ORDER — SODIUM CHLORIDE 9 MG/ML
INJECTION, SOLUTION INTRAVENOUS CONTINUOUS
OUTPATIENT
Start: 2025-08-22

## 2025-07-28 RX ORDER — SODIUM CHLORIDE 9 MG/ML
INJECTION, SOLUTION INTRAVENOUS CONTINUOUS
OUTPATIENT
Start: 2025-08-29

## 2025-07-28 NOTE — PATIENT INSTRUCTIONS
Pre Procedure Information    Name: Shelby Salcedo  : 1950  MRN: [id number]  Phone:  [phone]    Procedure (s) and Date (s):  1)  Right Distal GSV Varithena Ablation - 2025  2)  Right SSV Radiofrequency Ablation - 2025  3)  Left Distal GSV Varithena Ablation with Microphlebectomies 10-20 - 2025    Do not eat or drink anything after midnight.    We will call you the day prior with your time to report for surgery.  You will check in at Ambulatory Surgery on the Ground Floor.  Shower prior to procedure as your leg will be wrapped for 48 hours and you will not be able to shower.  Do not wear lotion, oil, or cream on you legs on the day of your procedure.  This will cause the Doctor's sylvia to fade.  Wear shorts, skirt, or loose pants and larger shoes (house shoes).   Bring a pillow or two and leave in the car (to prop your leg).  Before and during your procedure, to help keep you calm and comfortable, you may be given medicine that makes you sleepy.  This is called sedation or anesthesia.  Possible side effects may include:  Drowsiness  Weakness  Upset Stomach  Vomiting  Bring someone with you to drive, stay during the procedure, and drive you home.  Someone will also need to stay with you at home the first night.  No driving for 24 hours after sedation.  Prepare to walk 15 minutes out of each hour for the first 48 hours post op.  Prepare to keep your legs propped up while sitting for the first 48 hours or 2 days following your procedure (recliner, couch, or chair).  Dressings will remain on your legs for at least 48 hours or 2 days after procedure.  Full discharge instructions will be provided on the day of your procedure.    Typically, you are in and out within a few hours.  We will follow you postoperatively with a 4 day post ablation ultrasound and then a 1 month, 3 month, 6 month, and 1 year post ablation visit with the physician or nurse practitioner with or without an ultrasound.   Please make every effort to attend these appointments as they will be essential to following you progress.    Please kindly notify us as soon as possible if you need to reschedule your appointment.    For Financial Services and/or billing questions:  Patient Accounts Customer Service (Billing questions) Email [email or web address], or call [phone] or [phone]. Live Chat: [email or web address]  Patient Financial Services (Medicaid/Financial Assistance) Email [email or web address] or call [phone].   Financial Clearance (Assists with payment arrangements for upcoming scheduled services) Call [phone]      As always, we look forward to caring for you and are happy to answer your questions.  Please call us at [phone].

## 2025-07-28 NOTE — PROGRESS NOTES
VE[facility]ENTER CLINIC NOTE    Patient ID: Shelby Salcedo is a 74 y.o. female.    I. HISTORY     Chief Complaint:   Chief Complaint   Patient presents with    Follow-up     Veins 6M venous        History of Present Illness    CHIEF COMPLAINT:  Patient presents today for worsening varicose veins with associated pain and skin changes.    VARICOSE VEINS:  She reports ongoing varicose vein symptoms that worsened starting in June/July with new veins appearing around her thighs and ankles that were not previously present. She describes thigh pain characterized by aching and throbbing, with tenderness and soreness particularly when attempting to relax. Pain is more pronounced at night. She can feel veins through compression stockings and reports visual changes including new dark spots developing around her ankles and thigh region. Symptoms persist despite compression stockings and leg elevation. Symptoms worsen in temperatures exceeding 100 degrees, though were more manageable during fall season. She denies posterior leg vein discomfort. Her condition was stable prior to June/July but then experienced a sudden surge with worsening symptoms.    CURRENT TREATMENT:  She continues using compression stockings and remains compliant with recommended treatment despite environmental challenges, wearing them even during high temperature conditions.      ROS:  General: -fever, -chills, -fatigue, -weight gain, -weight loss  Eyes: -vision changes, -redness, -discharge  ENT: -ear pain, -nasal congestion, -sore throat  Cardiovascular: -chest pain, -palpitations, +lower extremity edema  Respiratory: -cough, -shortness of breath  Gastrointestinal: -abdominal pain, -nausea, -vomiting, -diarrhea, -constipation, -blood in stool  Genitourinary: -dysuria, -hematuria, -frequency  Musculoskeletal: -joint pain, -muscle pain, +limb pain  Skin: -rash, +lesion  Neurological: -headache, -dizziness, -numbness, -tingling  Psychiatric: -anxiety, -depression,  -sleep difficulty         Bilateral complete venous reflux study performed 11/26/2024 shows no evidence of DVT bilaterally.  The study also shows proximal left great saphenous vein and partial segments of the right great saphenous vein are not visualized, presumably from previous vein stripping or ablation.  The study also shows dilation reflux in the mid portion of the right great saphenous vein, right small saphenous vein, and left distal great saphenous vein.  Refluxing varicosities also noted right medial leg and right posterior calf.    The patient was initially seen on 10/29/2024 by referral from Stacey Chap[facility] for evaluation of lower extremity edema, pain, hyperpigmentation and history of chronic venous insufficiency.  Symptoms are progressive/worsening and began greater than 30 years ago.  Location is bilateral lower extremities below the knees. Symptoms are worse at the end of the day.  History of venous interventions includes bilateral vein strippings versus micros about 25 years ago.  Positive, mother, family history of venous disease.  Also admits to history of varicose vein rupture, but that was before her procedures.  Denies history of DVT or cellulitis.    Venous Disease Medical Necessity Documentation Initial Visit Date:  10/29/2024 Return Check Date:   03/03/2025   Have you ever had a rupture or bleed from a varicose vein in your leg(s)?              [x] Yes  [] No   [] Yes   [x] No   Have you ever been diagnosed with phlebitis, cellulitis, or inflammation in the area of the varicose veins of  your leg(s)?  [] Yes  [x] No    [] Yes   [x] No   Do you have darkened or inflamed skin on your legs?   [x] Yes   [] No   [x] Yes   [] No   Do you have leg swelling?     [x] Yes   [] No   [] Yes   [x] No   Do you have leg pain?   [x] Yes   [] No   [] Yes   [x] No   If yes, describe the type of pain?    [x]   Stabbing [x]  Radiating [x]  Aching   [x]  Tightness [x]  Throbbing               [x]  Burning [x]   Cramping          Improved    Do you have leg discomfort?   [x] Yes   [] No   [] Yes   [x] No   If yes, describe the type of discomfort?    [x]  Heaviness [x]  Fullness   []  Restlessness [x] Tired/Fatigued [x] Itching              Have you ever worn compression hose?   [x] Yes   [] No   [x] Yes   [] No   If yes, how long?  1.5 YEARS   Almost 2 yrs      Do you elevate your legs while sitting?   [x] Yes   [] No   [x] Yes   [] No   Does venous disease (varicose veins, ulcers, skin changes, leg pain/swelling) interfere with your daily life?  If yes, check activities you are limited or unable to do.    []  Shower  [x]   Walk  []  Exercise  [] Play with children/grandchildren  [x]  Shop [] Work [x] Stand for any period of time [x] Sleep                               [x] Sitting for an extended period of time.           [x] Yes   [] No   [x] Yes   [] No    Improved   Do you exercise/have you tried to exercise (i.e.  Walk our participate in a regular exercise routine)?  [x] Yes  [] No   [x] Yes   [] No   BMI   30.31 29.9          History reviewed. No pertinent past medical history.     Past Surgical History:   Procedure Laterality Date    BUNIONECTOMY Bilateral     HYSTERECTOMY      OOPHORECTOMY      ROTATOR CUFF REPAIR Right        Social History     Tobacco Use   Smoking Status Never   Smokeless Tobacco Never         Current Outpatient Medications:     ascorbic acid, vitamin C, (VITAMIN C) 250 mg Chew, 500mg daily, Disp: , Rfl:     calcium carbonate (CALCIUM 600) 600 mg calcium (1,500 mg) Tab, 2 tablets daily, Disp: , Rfl:     cholecalciferol, vitamin D3, (VITAMIN D3) 25 mcg (1,000 unit) capsule, Take by oral route., Disp: , Rfl:     diclofenac sodium (VOLTAREN) 1 % Gel, Apply topically., Disp: , Rfl:     ergocalciferol (ERGOCALCIFEROL) 50,000 unit Cap, , Disp: , Rfl:     iron,carb/vit C/vit B12/folic (IRON 100 PLUS ORAL), , Disp: , Rfl:     latanoprost 0.005 % ophthalmic solution, Place into both eyes., Disp: , Rfl:      POTASSIUM-CALCIUM-MAGNESIUM ORAL, , Disp: , Rfl:        II. PHYSICAL EXAM     Physical Exam  Constitutional:       General: She is awake. She is not in acute distress.     Appearance: Normal appearance. She is not ill-appearing or toxic-appearing.   HENT:      Head: Normocephalic and atraumatic.   Eyes:      Extraocular Movements: Extraocular movements intact.      Conjunctiva/sclera: Conjunctivae normal.      Pupils: Pupils are equal, round, and reactive to light.   Neck:      Vascular: No carotid bruit or JVD.   Cardiovascular:      Rate and Rhythm: Normal rate and regular rhythm.      Pulses:           Dorsalis pedis pulses are detected w/ Doppler on the right side and detected w/ Doppler on the left side.        Posterior tibial pulses are detected w/ Doppler on the right side and detected w/ Doppler on the left side.      Heart sounds: No murmur heard.  Pulmonary:      Effort: Pulmonary effort is normal. No respiratory distress.      Breath sounds: No stridor. No wheezing, rhonchi or rales.   Musculoskeletal:         General: No swelling, tenderness or deformity.      Right lower le+ Edema present.      Left lower le+ Edema present.      Comments: No active cellulitis or open ulceration bilaterally.   Feet:      Comments: Triphasic hand-held dopplerable pulses of the bilateral dorsalis pedis and posterior tibial arteries.  Skin:     General: Skin is warm.      Capillary Refill: Capillary refill takes less than 2 seconds.      Coloration: Skin is not ashen.      Findings: No bruising, erythema, lesion, rash or wound.   Neurological:      Mental Status: She is alert and oriented to person, place, and time.      Motor: No weakness.   Psychiatric:         Speech: Speech normal.         Behavior: Behavior normal. Behavior is cooperative.       Reticular/Spider veins noted:  RLE: anterior calf, medial calf, posterior calf, and lateral calf  LLE: anterior calf, medial calf, posterior calf, and lateral  calf    Varicose veins noted:  RLE: none  LLE:  none    CEAP Classification  Clinical Signs: Class 4 - Skin changes ascribed to venous disease  Etiologic Classification: Primary  Anatomic distribution: Superficial  Pathophysiologic dysfunction: Reflux                       Venous Clinical Severity Score  Pain:2=Daily, moderate activity limitation, occasional analgesics  Varicose Veins: 2=Multiple. GS varicose veins confined to calf or thigh  Venous Edema: 2=Afternoon edema, above ankle  Pigmentation: 1=Diffuse, but limited in area and old (brown)  Inflammation: 0=None  Induration: 0=None  Number of Active Ulcers: 0=0  Active Ulceration, Duration: 0=None  Active Ulcer Size: 0=None  Compressive Therapy: 3=Full compliance, stockings + elevation  Total Score: 10       III. ASSESSMENT & PLAN (MEDICAL DECISION MAKING)     1. Venous insufficiency (chronic) (peripheral)    2. Leg pain, bilateral    3. Edema, lower extremity    4. Varicose veins of bilateral lower extremities with pain    5. Hyperpigmentation of skin          Assessment & Plan    VARICOSE VEINS AND VENOUS INSUFFICIENCY:  - Identified reflux in lower right leg, small saphenous vein involvement on right, and varicose veins on LLE. Recommend 3-step treatment plan:  - 1. Foam sclerotherapy for right lower leg on Thursday  - 2. Radiofrequency ablation of right small saphenous vein on Friday  - 3. Foam sclerotherapy and microphlebectomy for LLE on following Friday  - Skin darkening noted due to increased pressure in lower legs from venous insufficiency.  - Explained that heat can worsen varicose vein symptoms due to vein relaxation and increased swelling.  - Described difference between reticular veins (like big spider veins) and varicose veins.  - Provided overview of each procedure: foam sclerotherapy uses foam to chemically close veins, radiofrequency ablation uses laser-like technology to close larger veins, microphlebectomy is surgical removal of varicose  veins.  - Explained post-procedure expectations, including use of compression wraps and ability to walk.  - Patient to arrange for a  for Friday procedures due to sedation.  - Ordered foam sclerotherapy procedure for right lower leg, radiofrequency ablation of right small saphenous vein, and foam sclerotherapy and microphlebectomy for LLE.    FOLLOW-UP CARE:  - Follow up to schedule 3 separate procedure appointments: Thursday for foam sclerotherapy (right leg), Friday for radiofrequency ablation (right leg), following Friday for foam sclerotherapy and microphlebectomy (LLE).            Baltazar Ford, DO    This note was generated with the assistance of ambient listening technology. Verbal consent was obtained by the patient and accompanying visitor(s) for the recording of patient appointment to facilitate this note. I attest to having reviewed and edited the generated note for accuracy, though some syntax or spelling errors may persist. Please contact the author of this note for any clarification.

## 2025-08-19 ENCOUNTER — TELEPHONE (OUTPATIENT)
Dept: VASCULAR SURGERY | Facility: CLINIC | Age: 75
End: 2025-08-19
Payer: MEDICARE

## 2025-08-21 ENCOUNTER — PROCEDURE VISIT (OUTPATIENT)
Dept: VASCULAR SURGERY | Facility: CLINIC | Age: 75
End: 2025-08-21
Attending: FAMILY MEDICINE
Payer: MEDICARE

## 2025-08-21 VITALS
BODY MASS INDEX: 29.71 KG/M2 | RESPIRATION RATE: 16 BRPM | DIASTOLIC BLOOD PRESSURE: 89 MMHG | WEIGHT: 174 LBS | SYSTOLIC BLOOD PRESSURE: 140 MMHG | HEART RATE: 68 BPM | HEIGHT: 64 IN

## 2025-08-21 DIAGNOSIS — I87.2 VENOUS INSUFFICIENCY (CHRONIC) (PERIPHERAL): Primary | ICD-10-CM

## 2025-08-21 DIAGNOSIS — M79.605 LEG PAIN, BILATERAL: ICD-10-CM

## 2025-08-21 DIAGNOSIS — M79.604 LEG PAIN, BILATERAL: ICD-10-CM

## 2025-08-21 DIAGNOSIS — R60.0 EDEMA, LOWER EXTREMITY: ICD-10-CM

## 2025-08-21 DIAGNOSIS — I83.813 VARICOSE VEINS OF BILATERAL LOWER EXTREMITIES WITH PAIN: ICD-10-CM

## 2025-08-21 PROCEDURE — C1894 INTRO/SHEATH, NON-LASER: HCPCS

## 2025-08-21 PROCEDURE — 99999PBSHW PR PBB SHADOW TECHNICAL ONLY FILED TO HB: Mod: PBBFAC,,,

## 2025-08-21 PROCEDURE — 36465 NJX NONCMPND SCLRSNT 1 VEIN: CPT | Mod: PBBFAC | Performed by: FAMILY MEDICINE

## 2025-08-21 PROCEDURE — 27000930 HC PROCEDURE PACK, MEDLINE

## 2025-08-21 PROCEDURE — 27000272 HC BANDAGE KERLIX

## 2025-08-21 PROCEDURE — 27000932 HC BANDAGE ELAST SELF CLOSURE

## 2025-08-21 RX ORDER — LIDOCAINE HYDROCHLORIDE 10 MG/ML
5 INJECTION, SOLUTION INFILTRATION; PERINEURAL ONCE
Status: COMPLETED | OUTPATIENT
Start: 2025-08-21 | End: 2025-08-21

## 2025-08-21 RX ADMIN — POLIDOCANOL 5 ML: KIT at 09:08

## 2025-08-21 RX ADMIN — LIDOCAINE HYDROCHLORIDE 5 ML: 10 INJECTION, SOLUTION INFILTRATION; PERINEURAL at 09:08

## 2025-08-22 ENCOUNTER — ANESTHESIA EVENT (OUTPATIENT)
Dept: PAIN MEDICINE | Facility: HOSPITAL | Age: 75
End: 2025-08-22
Payer: MEDICARE

## 2025-08-22 ENCOUNTER — HOSPITAL ENCOUNTER (OUTPATIENT)
Facility: HOSPITAL | Age: 75
Discharge: HOME | End: 2025-08-22
Attending: FAMILY MEDICINE | Admitting: FAMILY MEDICINE
Payer: MEDICARE

## 2025-08-22 ENCOUNTER — ANESTHESIA (OUTPATIENT)
Dept: PAIN MEDICINE | Facility: HOSPITAL | Age: 75
End: 2025-08-22
Payer: MEDICARE

## 2025-08-22 VITALS
BODY MASS INDEX: 30.05 KG/M2 | HEART RATE: 70 BPM | SYSTOLIC BLOOD PRESSURE: 157 MMHG | RESPIRATION RATE: 16 BRPM | HEART RATE: 78 BPM | SYSTOLIC BLOOD PRESSURE: 145 MMHG | DIASTOLIC BLOOD PRESSURE: 78 MMHG | OXYGEN SATURATION: 99 % | DIASTOLIC BLOOD PRESSURE: 77 MMHG | RESPIRATION RATE: 26 BRPM | HEIGHT: 64 IN | TEMPERATURE: 98 F | WEIGHT: 176 LBS | OXYGEN SATURATION: 98 %

## 2025-08-22 DIAGNOSIS — I87.2 VENOUS INSUFFICIENCY (CHRONIC) (PERIPHERAL): Primary | ICD-10-CM

## 2025-08-22 PROCEDURE — 63600175 PHARM REV CODE 636 W HCPCS: Performed by: NURSE ANESTHETIST, CERTIFIED REGISTERED

## 2025-08-22 PROCEDURE — 27000284 HC CANNULA NASAL: Performed by: NURSE ANESTHETIST, CERTIFIED REGISTERED

## 2025-08-22 PROCEDURE — 36475 ENDOVENOUS RF 1ST VEIN: CPT | Mod: RT,,, | Performed by: FAMILY MEDICINE

## 2025-08-22 PROCEDURE — 63600175 PHARM REV CODE 636 W HCPCS: Performed by: FAMILY MEDICINE

## 2025-08-22 PROCEDURE — 37000008 HC ANESTHESIA 1ST 15 MINUTES: Performed by: FAMILY MEDICINE

## 2025-08-22 PROCEDURE — 36475 ENDOVENOUS RF 1ST VEIN: CPT | Performed by: FAMILY MEDICINE

## 2025-08-22 PROCEDURE — 25000003 PHARM REV CODE 250: Performed by: FAMILY MEDICINE

## 2025-08-22 PROCEDURE — 27201423 OPTIME MED/SURG SUP & DEVICES STERILE SUPPLY: Performed by: FAMILY MEDICINE

## 2025-08-22 PROCEDURE — 37000009 HC ANESTHESIA EA ADD 15 MINS: Performed by: FAMILY MEDICINE

## 2025-08-22 RX ORDER — PROPOFOL 10 MG/ML
VIAL (ML) INTRAVENOUS
Status: DISCONTINUED | OUTPATIENT
Start: 2025-08-22 | End: 2025-08-22

## 2025-08-22 RX ORDER — LIDOCAINE HYDROCHLORIDE 20 MG/ML
INJECTION, SOLUTION EPIDURAL; INFILTRATION; INTRACAUDAL; PERINEURAL
Status: DISCONTINUED | OUTPATIENT
Start: 2025-08-22 | End: 2025-08-22

## 2025-08-22 RX ORDER — LIDOCAINE HYDROCHLORIDE 10 MG/ML
INJECTION, SOLUTION INFILTRATION; PERINEURAL CODE/TRAUMA/SEDATION MEDICATION
Status: DISCONTINUED | OUTPATIENT
Start: 2025-08-22 | End: 2025-08-22 | Stop reason: HOSPADM

## 2025-08-22 RX ORDER — SODIUM CHLORIDE 9 MG/ML
INJECTION, SOLUTION INTRAVENOUS CONTINUOUS
Status: DISCONTINUED | OUTPATIENT
Start: 2025-08-22 | End: 2025-08-22 | Stop reason: HOSPADM

## 2025-08-22 RX ORDER — MUPIROCIN 20 MG/G
OINTMENT TOPICAL CODE/TRAUMA/SEDATION MEDICATION
Status: DISCONTINUED | OUTPATIENT
Start: 2025-08-22 | End: 2025-08-22 | Stop reason: HOSPADM

## 2025-08-22 RX ADMIN — LIDOCAINE HYDROCHLORIDE 60 MG: 20 INJECTION, SOLUTION EPIDURAL; INFILTRATION; INTRACAUDAL; PERINEURAL at 01:08

## 2025-08-22 RX ADMIN — PROPOFOL 50 MCG/KG/MIN: 10 INJECTION, EMULSION INTRAVENOUS at 01:08

## 2025-08-22 RX ADMIN — PROPOFOL 100 MG: 10 INJECTION, EMULSION INTRAVENOUS at 01:08

## 2025-08-29 ENCOUNTER — ANESTHESIA EVENT (OUTPATIENT)
Dept: PAIN MEDICINE | Facility: HOSPITAL | Age: 75
End: 2025-08-29
Payer: MEDICARE

## 2025-08-29 ENCOUNTER — HOSPITAL ENCOUNTER (OUTPATIENT)
Facility: HOSPITAL | Age: 75
Discharge: HOME | End: 2025-08-29
Attending: FAMILY MEDICINE | Admitting: FAMILY MEDICINE
Payer: MEDICARE

## 2025-08-29 ENCOUNTER — ANESTHESIA (OUTPATIENT)
Dept: PAIN MEDICINE | Facility: HOSPITAL | Age: 75
End: 2025-08-29
Payer: MEDICARE

## 2025-08-29 VITALS
SYSTOLIC BLOOD PRESSURE: 162 MMHG | WEIGHT: 176 LBS | DIASTOLIC BLOOD PRESSURE: 74 MMHG | HEIGHT: 64 IN | OXYGEN SATURATION: 100 % | HEART RATE: 74 BPM | BODY MASS INDEX: 30.05 KG/M2 | RESPIRATION RATE: 12 BRPM | TEMPERATURE: 98 F

## 2025-08-29 DIAGNOSIS — I87.2 VENOUS INSUFFICIENCY (CHRONIC) (PERIPHERAL): Primary | ICD-10-CM

## 2025-08-29 PROCEDURE — 63600175 PHARM REV CODE 636 W HCPCS: Performed by: NURSE ANESTHETIST, CERTIFIED REGISTERED

## 2025-08-29 PROCEDURE — 37000009 HC ANESTHESIA EA ADD 15 MINS: Performed by: FAMILY MEDICINE

## 2025-08-29 PROCEDURE — 36465 NJX NONCMPND SCLRSNT 1 VEIN: CPT | Mod: 51,LT,, | Performed by: FAMILY MEDICINE

## 2025-08-29 PROCEDURE — 27000716 HC OXISENSOR PROBE, ANY SIZE: Performed by: NURSE ANESTHETIST, CERTIFIED REGISTERED

## 2025-08-29 PROCEDURE — C9399 UNCLASSIFIED DRUGS OR BIOLOG: HCPCS | Performed by: FAMILY MEDICINE

## 2025-08-29 PROCEDURE — 25000003 PHARM REV CODE 250: Performed by: FAMILY MEDICINE

## 2025-08-29 PROCEDURE — 37765 STAB PHLEB VEINS XTR 10-20: CPT | Mod: LT,,, | Performed by: FAMILY MEDICINE

## 2025-08-29 PROCEDURE — 37000008 HC ANESTHESIA 1ST 15 MINUTES: Performed by: FAMILY MEDICINE

## 2025-08-29 PROCEDURE — 27000284 HC CANNULA NASAL: Performed by: NURSE ANESTHETIST, CERTIFIED REGISTERED

## 2025-08-29 PROCEDURE — C1894 INTRO/SHEATH, NON-LASER: HCPCS | Performed by: FAMILY MEDICINE

## 2025-08-29 PROCEDURE — 37765 STAB PHLEB VEINS XTR 10-20: CPT | Performed by: FAMILY MEDICINE

## 2025-08-29 PROCEDURE — 63600175 PHARM REV CODE 636 W HCPCS: Performed by: FAMILY MEDICINE

## 2025-08-29 PROCEDURE — 36475 ENDOVENOUS RF 1ST VEIN: CPT | Performed by: FAMILY MEDICINE

## 2025-08-29 RX ORDER — PHENYLEPHRINE HYDROCHLORIDE 10 MG/ML
INJECTION INTRAVENOUS
Status: DISCONTINUED | OUTPATIENT
Start: 2025-08-29 | End: 2025-08-29

## 2025-08-29 RX ORDER — PROPOFOL 10 MG/ML
VIAL (ML) INTRAVENOUS
Status: DISCONTINUED | OUTPATIENT
Start: 2025-08-29 | End: 2025-08-29

## 2025-08-29 RX ORDER — FENTANYL CITRATE 50 UG/ML
INJECTION, SOLUTION INTRAMUSCULAR; INTRAVENOUS
Status: DISCONTINUED | OUTPATIENT
Start: 2025-08-29 | End: 2025-08-29

## 2025-08-29 RX ORDER — SODIUM CHLORIDE 9 MG/ML
INJECTION, SOLUTION INTRAVENOUS CONTINUOUS
Status: DISCONTINUED | OUTPATIENT
Start: 2025-08-29 | End: 2025-08-29 | Stop reason: HOSPADM

## 2025-08-29 RX ORDER — LIDOCAINE HYDROCHLORIDE 10 MG/ML
INJECTION, SOLUTION INFILTRATION; PERINEURAL CODE/TRAUMA/SEDATION MEDICATION
Status: DISCONTINUED | OUTPATIENT
Start: 2025-08-29 | End: 2025-08-29 | Stop reason: HOSPADM

## 2025-08-29 RX ORDER — LIDOCAINE HYDROCHLORIDE 20 MG/ML
INJECTION, SOLUTION EPIDURAL; INFILTRATION; INTRACAUDAL; PERINEURAL
Status: DISCONTINUED | OUTPATIENT
Start: 2025-08-29 | End: 2025-08-29

## 2025-08-29 RX ORDER — MUPIROCIN 20 MG/G
OINTMENT TOPICAL CODE/TRAUMA/SEDATION MEDICATION
Status: DISCONTINUED | OUTPATIENT
Start: 2025-08-29 | End: 2025-08-29 | Stop reason: HOSPADM

## 2025-08-29 RX ADMIN — LIDOCAINE HYDROCHLORIDE 40 MG: 20 INJECTION, SOLUTION EPIDURAL; INFILTRATION; INTRACAUDAL; PERINEURAL at 07:08

## 2025-08-29 RX ADMIN — PHENYLEPHRINE HYDROCHLORIDE 100 MCG: 10 INJECTION INTRAVENOUS at 07:08

## 2025-08-29 RX ADMIN — FENTANYL CITRATE 25 MCG: 50 INJECTION INTRAMUSCULAR; INTRAVENOUS at 07:08

## 2025-08-29 RX ADMIN — PROPOFOL 100 MG: 10 INJECTION, EMULSION INTRAVENOUS at 07:08

## 2025-08-29 RX ADMIN — PHENYLEPHRINE HYDROCHLORIDE 100 MCG: 10 INJECTION INTRAVENOUS at 08:08

## 2025-08-29 RX ADMIN — PROPOFOL 50 MCG/KG/MIN: 10 INJECTION, EMULSION INTRAVENOUS at 07:08

## 2025-09-02 ENCOUNTER — OFFICE VISIT (OUTPATIENT)
Dept: VASCULAR SURGERY | Facility: CLINIC | Age: 75
End: 2025-09-02
Payer: MEDICARE

## 2025-09-02 ENCOUNTER — HOSPITAL ENCOUNTER (OUTPATIENT)
Facility: HOSPITAL | Age: 75
Discharge: HOME | End: 2025-09-02
Attending: FAMILY MEDICINE
Payer: MEDICARE

## 2025-09-02 VITALS
WEIGHT: 170 LBS | HEIGHT: 64 IN | RESPIRATION RATE: 20 BRPM | HEART RATE: 80 BPM | SYSTOLIC BLOOD PRESSURE: 142 MMHG | BODY MASS INDEX: 29.02 KG/M2 | DIASTOLIC BLOOD PRESSURE: 89 MMHG

## 2025-09-02 DIAGNOSIS — R60.0 EDEMA, LOWER EXTREMITY: ICD-10-CM

## 2025-09-02 DIAGNOSIS — M79.604 LEG PAIN, BILATERAL: ICD-10-CM

## 2025-09-02 DIAGNOSIS — I87.2 VENOUS INSUFFICIENCY (CHRONIC) (PERIPHERAL): Primary | ICD-10-CM

## 2025-09-02 DIAGNOSIS — I87.2 VENOUS INSUFFICIENCY (CHRONIC) (PERIPHERAL): ICD-10-CM

## 2025-09-02 DIAGNOSIS — I83.813 VARICOSE VEINS OF BILATERAL LOWER EXTREMITIES WITH PAIN: ICD-10-CM

## 2025-09-02 DIAGNOSIS — L81.9 HYPERPIGMENTATION OF SKIN: ICD-10-CM

## 2025-09-02 DIAGNOSIS — M79.605 LEG PAIN, BILATERAL: ICD-10-CM

## 2025-09-02 PROCEDURE — 99214 OFFICE O/P EST MOD 30 MIN: CPT | Mod: PBBFAC,25 | Performed by: FAMILY MEDICINE

## 2025-09-02 PROCEDURE — 99024 POSTOP FOLLOW-UP VISIT: CPT | Mod: S$PBB,,, | Performed by: FAMILY MEDICINE

## 2025-09-02 PROCEDURE — 99999 PR PBB SHADOW E&M-EST. PATIENT-LVL IV: CPT | Mod: PBBFAC,,, | Performed by: FAMILY MEDICINE

## 2025-09-02 PROCEDURE — 93971 EXTREMITY STUDY: CPT | Mod: TC,LT

## 2025-09-02 PROCEDURE — 93971 EXTREMITY STUDY: CPT | Mod: 26,LT,, | Performed by: FAMILY MEDICINE

## (undated) DEVICE — SOL CONTINU-FLO SET 2 LAV

## (undated) DEVICE — GLOVE SENSICARE PI SURG 7.5

## (undated) DEVICE — CATH IV INTROCAN 22G X 1

## (undated) DEVICE — CATH INTROCAN SAF 2 IV 22GX1IN

## (undated) DEVICE — SHEATH INTRO 5FR 0.035IN 55CM

## (undated) DEVICE — KIT IV START

## (undated) DEVICE — Device

## (undated) DEVICE — GOWN POLY REINF BRTH SLV XL

## (undated) DEVICE — SUT ETHILON 3-0 PS2 18 BLK

## (undated) DEVICE — COVER SNAP KAP 26IN

## (undated) DEVICE — BANDAGE GAUZE COT STRL 4.5X4.1

## (undated) DEVICE — APPLICATOR CHLORAPREP ORN 26ML

## (undated) DEVICE — BANDAGE COBAN COMP SYSTEM

## (undated) DEVICE — BNDG COFLEX FOAM LF2 ST 6X5YD

## (undated) DEVICE — KIT INTRO MICRO NIT VSI 4FR

## (undated) DEVICE — SPONGE COTTON TRAY 4X4IN

## (undated) DEVICE — SET EXTENSION CLEARLINK 2INJ

## (undated) DEVICE — BNDG COFLEX FOAM LF2 ST 4X5YD

## (undated) DEVICE — SET EXT STD BORE CATH 7.6IN

## (undated) DEVICE — GLOVE SENSICARE PI SURG 6.5

## (undated) DEVICE — BANDAGE MATRIX HK LOOP 6IN 5YD